# Patient Record
Sex: FEMALE | Race: OTHER | Employment: FULL TIME | URBAN - METROPOLITAN AREA
[De-identification: names, ages, dates, MRNs, and addresses within clinical notes are randomized per-mention and may not be internally consistent; named-entity substitution may affect disease eponyms.]

---

## 2019-08-07 ENCOUNTER — APPOINTMENT (OUTPATIENT)
Dept: CT IMAGING | Age: 34
DRG: 194 | End: 2019-08-07
Attending: PHYSICIAN ASSISTANT
Payer: COMMERCIAL

## 2019-08-07 ENCOUNTER — HOSPITAL ENCOUNTER (INPATIENT)
Age: 34
LOS: 4 days | Discharge: HOME | DRG: 194 | End: 2019-08-11
Attending: EMERGENCY MEDICINE | Admitting: FAMILY MEDICINE
Payer: COMMERCIAL

## 2019-08-07 ENCOUNTER — APPOINTMENT (OUTPATIENT)
Dept: GENERAL RADIOLOGY | Age: 34
DRG: 194 | End: 2019-08-07
Attending: EMERGENCY MEDICINE
Payer: COMMERCIAL

## 2019-08-07 DIAGNOSIS — R06.00 DYSPNEA, UNSPECIFIED TYPE: Primary | ICD-10-CM

## 2019-08-07 DIAGNOSIS — R91.8 ABNORMAL CT SCAN, LUNG: ICD-10-CM

## 2019-08-07 LAB
ALBUMIN SERPL-MCNC: 3 G/DL (ref 3.4–5)
ALBUMIN/GLOB SERPL: 0.7 {RATIO} (ref 0.8–1.7)
ALP SERPL-CCNC: 85 U/L (ref 45–117)
ALT SERPL-CCNC: 21 U/L (ref 13–56)
ANION GAP SERPL CALC-SCNC: 7 MMOL/L (ref 3–18)
APPEARANCE UR: CLEAR
AST SERPL-CCNC: 16 U/L (ref 10–38)
ATRIAL RATE: 93 BPM
BASOPHILS # BLD: 0 K/UL (ref 0–0.1)
BASOPHILS NFR BLD: 0 % (ref 0–2)
BILIRUB SERPL-MCNC: 0.2 MG/DL (ref 0.2–1)
BILIRUB UR QL: NEGATIVE
BUN SERPL-MCNC: 11 MG/DL (ref 7–18)
BUN/CREAT SERPL: 18 (ref 12–20)
CALCIUM SERPL-MCNC: 8.5 MG/DL (ref 8.5–10.1)
CALCULATED P AXIS, ECG09: 36 DEGREES
CALCULATED R AXIS, ECG10: 5 DEGREES
CALCULATED T AXIS, ECG11: 57 DEGREES
CHLORIDE SERPL-SCNC: 107 MMOL/L (ref 100–111)
CK MB CFR SERPL CALC: NORMAL % (ref 0–4)
CK MB SERPL-MCNC: <1 NG/ML (ref 5–25)
CK SERPL-CCNC: 152 U/L (ref 26–192)
CO2 SERPL-SCNC: 27 MMOL/L (ref 21–32)
COLOR UR: YELLOW
CREAT SERPL-MCNC: 0.61 MG/DL (ref 0.6–1.3)
D DIMER PPP FEU-MCNC: 0.56 UG/ML(FEU)
DIAGNOSIS, 93000: NORMAL
DIFFERENTIAL METHOD BLD: ABNORMAL
EOSINOPHIL # BLD: 0.6 K/UL (ref 0–0.4)
EOSINOPHIL NFR BLD: 6 % (ref 0–5)
ERYTHROCYTE [DISTWIDTH] IN BLOOD BY AUTOMATED COUNT: 15.2 % (ref 11.6–14.5)
GLOBULIN SER CALC-MCNC: 4.1 G/DL (ref 2–4)
GLUCOSE SERPL-MCNC: 121 MG/DL (ref 74–99)
GLUCOSE UR STRIP.AUTO-MCNC: NEGATIVE MG/DL
HCG UR QL: NEGATIVE
HCT VFR BLD AUTO: 35.7 % (ref 35–45)
HGB BLD-MCNC: 11.4 G/DL (ref 12–16)
HGB UR QL STRIP: NEGATIVE
KETONES UR QL STRIP.AUTO: NEGATIVE MG/DL
LEUKOCYTE ESTERASE UR QL STRIP.AUTO: NEGATIVE
LYMPHOCYTES # BLD: 2.6 K/UL (ref 0.9–3.6)
LYMPHOCYTES NFR BLD: 28 % (ref 21–52)
MCH RBC QN AUTO: 25.6 PG (ref 24–34)
MCHC RBC AUTO-ENTMCNC: 31.9 G/DL (ref 31–37)
MCV RBC AUTO: 80.2 FL (ref 74–97)
MONOCYTES # BLD: 0.6 K/UL (ref 0.05–1.2)
MONOCYTES NFR BLD: 7 % (ref 3–10)
NEUTS SEG # BLD: 5.4 K/UL (ref 1.8–8)
NEUTS SEG NFR BLD: 59 % (ref 40–73)
NITRITE UR QL STRIP.AUTO: NEGATIVE
P-R INTERVAL, ECG05: 118 MS
PH UR STRIP: 7 [PH] (ref 5–8)
PLATELET # BLD AUTO: 329 K/UL (ref 135–420)
PMV BLD AUTO: 9.5 FL (ref 9.2–11.8)
POTASSIUM SERPL-SCNC: 3.9 MMOL/L (ref 3.5–5.5)
PROT SERPL-MCNC: 7.1 G/DL (ref 6.4–8.2)
PROT UR STRIP-MCNC: NEGATIVE MG/DL
Q-T INTERVAL, ECG07: 372 MS
QRS DURATION, ECG06: 84 MS
QTC CALCULATION (BEZET), ECG08: 462 MS
RBC # BLD AUTO: 4.45 M/UL (ref 4.2–5.3)
SODIUM SERPL-SCNC: 141 MMOL/L (ref 136–145)
SP GR UR REFRACTOMETRY: 1.03 (ref 1–1.03)
TROPONIN I SERPL-MCNC: <0.02 NG/ML (ref 0–0.04)
UROBILINOGEN UR QL STRIP.AUTO: 1 EU/DL (ref 0.2–1)
VENTRICULAR RATE, ECG03: 93 BPM
WBC # BLD AUTO: 9.1 K/UL (ref 4.6–13.2)

## 2019-08-07 PROCEDURE — 74011636320 HC RX REV CODE- 636/320: Performed by: EMERGENCY MEDICINE

## 2019-08-07 PROCEDURE — 71275 CT ANGIOGRAPHY CHEST: CPT

## 2019-08-07 PROCEDURE — 65270000029 HC RM PRIVATE

## 2019-08-07 PROCEDURE — 80053 COMPREHEN METABOLIC PANEL: CPT

## 2019-08-07 PROCEDURE — 82550 ASSAY OF CK (CPK): CPT

## 2019-08-07 PROCEDURE — 94762 N-INVAS EAR/PLS OXIMTRY CONT: CPT

## 2019-08-07 PROCEDURE — 81003 URINALYSIS AUTO W/O SCOPE: CPT

## 2019-08-07 PROCEDURE — 77030027138 HC INCENT SPIROMETER -A

## 2019-08-07 PROCEDURE — 71046 X-RAY EXAM CHEST 2 VIEWS: CPT

## 2019-08-07 PROCEDURE — 99285 EMERGENCY DEPT VISIT HI MDM: CPT

## 2019-08-07 PROCEDURE — 81025 URINE PREGNANCY TEST: CPT

## 2019-08-07 PROCEDURE — 77030040361 HC SLV COMPR DVT MDII -B

## 2019-08-07 PROCEDURE — 85025 COMPLETE CBC W/AUTO DIFF WBC: CPT

## 2019-08-07 PROCEDURE — 93005 ELECTROCARDIOGRAM TRACING: CPT

## 2019-08-07 PROCEDURE — 85379 FIBRIN DEGRADATION QUANT: CPT

## 2019-08-07 RX ORDER — SODIUM CHLORIDE 9 MG/ML
100 INJECTION, SOLUTION INTRAVENOUS CONTINUOUS
Status: DISCONTINUED | OUTPATIENT
Start: 2019-08-07 | End: 2019-08-08

## 2019-08-07 RX ORDER — NORGESTIMATE AND ETHINYL ESTRADIOL 7DAYSX3 28
1 KIT ORAL DAILY
Status: DISCONTINUED | OUTPATIENT
Start: 2019-08-08 | End: 2019-08-08 | Stop reason: SDUPTHER

## 2019-08-07 RX ORDER — LEVOTHYROXINE SODIUM 75 UG/1
75 TABLET ORAL
Status: DISCONTINUED | OUTPATIENT
Start: 2019-08-08 | End: 2019-08-11 | Stop reason: HOSPADM

## 2019-08-07 RX ORDER — LEVOTHYROXINE SODIUM 75 UG/1
TABLET ORAL
COMMUNITY

## 2019-08-07 RX ORDER — NORGESTIMATE AND ETHINYL ESTRADIOL 7DAYSX3 28
KIT ORAL
COMMUNITY

## 2019-08-07 RX ORDER — VENLAFAXINE 37.5 MG/1
37.5 TABLET ORAL 3 TIMES DAILY
COMMUNITY

## 2019-08-07 RX ORDER — HEPARIN SODIUM 5000 [USP'U]/ML
5000 INJECTION, SOLUTION INTRAVENOUS; SUBCUTANEOUS EVERY 8 HOURS
Status: DISCONTINUED | OUTPATIENT
Start: 2019-08-07 | End: 2019-08-11 | Stop reason: HOSPADM

## 2019-08-07 RX ORDER — IPRATROPIUM BROMIDE AND ALBUTEROL SULFATE 2.5; .5 MG/3ML; MG/3ML
3 SOLUTION RESPIRATORY (INHALATION)
Status: DISCONTINUED | OUTPATIENT
Start: 2019-08-07 | End: 2019-08-11 | Stop reason: HOSPADM

## 2019-08-07 RX ORDER — VENLAFAXINE 37.5 MG/1
37.5 TABLET ORAL 3 TIMES DAILY
Status: DISCONTINUED | OUTPATIENT
Start: 2019-08-08 | End: 2019-08-11 | Stop reason: HOSPADM

## 2019-08-07 RX ADMIN — IOPAMIDOL 100 ML: 755 INJECTION, SOLUTION INTRAVENOUS at 20:06

## 2019-08-07 NOTE — ED PROVIDER NOTES
EMERGENCY DEPARTMENT HISTORY AND PHYSICAL EXAM    Date: 8/7/2019  Patient Name: Manuel Chamberlain    History of Presenting Illness     Chief Complaint   Patient presents with    Shortness of Breath    Palpitations         History Provided By: Patient    Chief Complaint: sob       Additional History (Context):   6:38 PM  Manuel Chamberlain is a 27 y.o. female  presents to the emergency department C/O shortness of breath for the past 2 days. Also complaining that her heart feels like it is racing when she gets up and moves around and had some dizziness. She is had some chest pain that felt sharp and lasted a few minutes around 11 AM.  None since. She has had a dry cough for the past week and a half, was seen by her PCP when it started and was diagnosed with a URI and given cough medication. No leg swelling, no fevers. She denies any long trips recently recent surgeries recent immobilizations history of blood clots or cancer but she does take birth control pills. Patient denies recent travel, incarceration, recent hospitalization, exotic pets. Patient states she works for [employer] and gets a TB test yearly last was in October and was negative    PCP: Jovana aVrgas MD    Current Facility-Administered Medications   Medication Dose Route Frequency Provider Last Rate Last Dose    0.9% sodium chloride infusion  100 mL/hr IntraVENous CONTINUOUS Jesus Rea MD        heparin (porcine) injection 5,000 Units  5,000 Units SubCUTAneous Gabe Ross MD           Past History     Past Medical History:  Past Medical History:   Diagnosis Date    Hashimoto's disease        Past Surgical History:  Past Surgical History:   Procedure Laterality Date    HX KNEE REPLACEMENT         Family History:  History reviewed. No pertinent family history.     Social History:  Social History     Tobacco Use    Smoking status: Former Smoker    Smokeless tobacco: Never Used   Substance Use Topics    Alcohol use: Not Currently    Drug use: Not on file       Allergies:  No Known Allergies    Review of Systems   Review of Systems   Constitutional: Negative for chills and fever. Respiratory: Positive for cough and shortness of breath. Cardiovascular: Positive for chest pain. Negative for leg swelling. Gastrointestinal: Negative for abdominal pain, diarrhea, nausea and vomiting. Musculoskeletal: Negative for back pain. Neurological: Negative for weakness and numbness. All other systems reviewed and are negative. Physical Exam     Vitals:    08/07/19 2030 08/07/19 2100 08/07/19 2130 08/07/19 2304   BP: (!) 141/95 (!) 147/97 149/73 126/72   Pulse: 95 87 84 81   Resp: 24 22 17 15   Temp:   98.9 °F (37.2 °C) 98.9 °F (37.2 °C)   SpO2: 91% 92% 94% 92%   Weight:       Height:         Physical Exam   Constitutional: She is oriented to person, place, and time. She appears well-developed and well-nourished. Alert well-appearing nontoxic     HENT:   Head: Normocephalic and atraumatic. Neck: Normal range of motion. Neck supple. Cardiovascular: Normal rate, regular rhythm, normal heart sounds and intact distal pulses. No murmur heard. Pulmonary/Chest: Effort normal and breath sounds normal. No respiratory distress. She has no wheezes. She has no rales. Abdominal: Soft. Bowel sounds are normal. There is no tenderness. Musculoskeletal:   No leg swelling or calf tenderness bilaterally   Neurological: She is alert and oriented to person, place, and time. Psychiatric: She has a normal mood and affect. Judgment normal.   Nursing note and vitals reviewed.           Diagnostic Study Results     Labs:     Recent Results (from the past 12 hour(s))   EKG, 12 LEAD, INITIAL    Collection Time: 08/07/19  5:33 PM   Result Value Ref Range    Ventricular Rate 93 BPM    Atrial Rate 93 BPM    P-R Interval 118 ms    QRS Duration 84 ms    Q-T Interval 372 ms    QTC Calculation (Bezet) 462 ms    Calculated P Axis 36 degrees Calculated R Axis 5 degrees    Calculated T Axis 57 degrees    Diagnosis       Normal sinus rhythm  Normal ECG  Confirmed by Gideon Castano MD, [facility] ([id number]) on 8/7/2019 7:22:39 PM     CBC WITH AUTOMATED DIFF    Collection Time: 08/07/19  5:41 PM   Result Value Ref Range    WBC 9.1 4.6 - 13.2 K/uL    RBC 4.45 4.20 - 5.30 M/uL    HGB 11.4 (L) 12.0 - 16.0 g/dL    HCT 35.7 35.0 - 45.0 %    MCV 80.2 74.0 - 97.0 FL    MCH 25.6 24.0 - 34.0 PG    MCHC 31.9 31.0 - 37.0 g/dL    RDW 15.2 (H) 11.6 - 14.5 %    PLATELET 152 857 - 768 K/uL    MPV 9.5 9.2 - 11.8 FL    NEUTROPHILS 59 40 - 73 %    LYMPHOCYTES 28 21 - 52 %    MONOCYTES 7 3 - 10 %    EOSINOPHILS 6 (H) 0 - 5 %    BASOPHILS 0 0 - 2 %    ABS. NEUTROPHILS 5.4 1.8 - 8.0 K/UL    ABS. LYMPHOCYTES 2.6 0.9 - 3.6 K/UL    ABS. MONOCYTES 0.6 0.05 - 1.2 K/UL    ABS. EOSINOPHILS 0.6 (H) 0.0 - 0.4 K/UL    ABS. BASOPHILS 0.0 0.0 - 0.1 K/UL    DF AUTOMATED     CARDIAC PANEL,(CK, CKMB & TROPONIN)    Collection Time: 08/07/19  5:41 PM   Result Value Ref Range     26 - 192 U/L    CK - MB <1.0 <3.6 ng/ml    CK-MB Index  0.0 - 4.0 %     CALCULATION NOT PERFORMED WHEN RESULT IS BELOW LINEAR LIMIT    Troponin-I, QT <0.02 0.0 - 3.774 NG/ML   METABOLIC PANEL, COMPREHENSIVE    Collection Time: 08/07/19  5:41 PM   Result Value Ref Range    Sodium 141 136 - 145 mmol/L    Potassium 3.9 3.5 - 5.5 mmol/L    Chloride 107 100 - 111 mmol/L    CO2 27 21 - 32 mmol/L    Anion gap 7 3.0 - 18 mmol/L    Glucose 121 (H) 74 - 99 mg/dL    BUN 11 7.0 - 18 MG/DL    Creatinine 0.61 0.6 - 1.3 MG/DL    BUN/Creatinine ratio 18 12 - 20      GFR est AA >60 >60 ml/min/1.73m2    GFR est non-AA >60 >60 ml/min/1.73m2    Calcium 8.5 8.5 - 10.1 MG/DL    Bilirubin, total 0.2 0.2 - 1.0 MG/DL    ALT (SGPT) 21 13 - 56 U/L    AST (SGOT) 16 10 - 38 U/L    Alk.  phosphatase 85 45 - 117 U/L    Protein, total 7.1 6.4 - 8.2 g/dL    Albumin 3.0 (L) 3.4 - 5.0 g/dL    Globulin 4.1 (H) 2.0 - 4.0 g/dL    A-G Ratio 0.7 (L) 0.8 - 1.7     D DIMER Collection Time: 08/07/19  5:41 PM   Result Value Ref Range    D DIMER 0.56 (H) <0.46 ug/ml(FEU)   URINALYSIS W/ RFLX MICROSCOPIC    Collection Time: 08/07/19  6:56 PM   Result Value Ref Range    Color YELLOW      Appearance CLEAR      Specific gravity 1.026 1.005 - 1.030      pH (UA) 7.0 5.0 - 8.0      Protein NEGATIVE  NEG mg/dL    Glucose NEGATIVE  NEG mg/dL    Ketone NEGATIVE  NEG mg/dL    Bilirubin NEGATIVE  NEG      Blood NEGATIVE  NEG      Urobilinogen 1.0 0.2 - 1.0 EU/dL    Nitrites NEGATIVE  NEG      Leukocyte Esterase NEGATIVE  NEG     HCG URINE, QL    Collection Time: 08/07/19  6:56 PM   Result Value Ref Range    HCG urine, QL NEGATIVE  NEG         Radiologic Studies:   CTA CHEST W OR W WO CONT   Final Result   IMPRESSION:      No evidence of a pulmonary embolism or aortic dissection. Mediastinal and hilar adenopathy. Diffuse reticular nodular interstitial infiltrates throughout both lungs with a   miliary type pattern with differential diagnoses including bacterial or viral   pneumonia versus miliary tuberculosis versus metastatic renal or thyroid   carcinoma. Correlate with clinical picture. Amirah Astudillo PA informed 9:25 PM August 7, 2019. XR CHEST PA LAT   Final Result   IMPRESSION:  No acute process        CT Results  (Last 48 hours)               08/07/19 2030  CTA CHEST W OR W WO CONT Final result    Impression:  IMPRESSION:       No evidence of a pulmonary embolism or aortic dissection. Mediastinal and hilar adenopathy. Diffuse reticular nodular interstitial infiltrates throughout both lungs with a   miliary type pattern with differential diagnoses including bacterial or viral   pneumonia versus miliary tuberculosis versus metastatic renal or thyroid   carcinoma. Correlate with clinical picture. Amirah Astudillo PA informed 9:25 PM August 7, 2019.            Narrative:  EXAM: CTA chest       INDICATION: Chest pain       COMPARISON: None       TECHNIQUE: Axial CT imaging from the thoracic inlet through the diaphragm with   intravenous contrast. Coronal and sagittal MIP reformats were generated. One or   more dose reduction techniques were used on this CT: automated exposure control,   adjustment of the mAs and/or kVp according to patient size, and iterative   reconstruction techniques. The specific techniques used on this CT exam have   been documented in the patient's electronic medical record. Digital Imaging and   Communications in Medicine (DICOM) format image data are available to   nonaffiliated external healthcare facilities or entities on a secure, media   free, reciprocally searchable basis with patient authorization for at least a   12-month period after this study. _______________       FINDINGS: This study was repeated due to poor contrast opacification on first   attempt. EXAM QUALITY: Overall exam quality is satisfactory. Pulmonary arterial   enhancement is adequate with adequate breath hold and no significant artifact. PULMONARY ARTERIES: No evidence of pulmonary embolism. LYMPH Nodes: Multiple enlarged right hilar lymph nodes are seen measuring up to   1.2 cm. There are enlarged AP window lymph nodes measuring 13 mm. Subcentimeter   left hilar lymph nodes are seen. PLEURA: There are no pleural effusions       HEART: Normal in size without pericardial effusion. VASCULATURE/MEDIASTINUM: There is no aortic dissection. LUNGS: There are diffuse reticular nodular interstitial infiltrates with a   miliary-type appearance throughout the right lung and to a lesser degree in the   left lung. Differential diagnoses includes bacterial or viral pneumonia versus   miliary tuberculosis versus thyroid or renal metastasis. Correlate with clinical   picture. AIRWAY: Normal.       UPPER ABDOMEN: There is 11 mm calculus in the upper pole the left kidney. Otherwise visualized solid organs are unremarkable.        OTHER: No acute or aggressive osseous abnormalities identified. _______________               CXR Results  (Last 48 hours)               08/07/19 1834  XR CHEST PA LAT Final result    Impression:  IMPRESSION:  No acute process       Narrative:  EXAM:  PA and Lateral Chest X-ray        INDICATION: Upper respiratory tract infection about one half weeks ago       COMPARISON: None       ___________       FINDINGS: Heart and mediastinal contours are within normal limits. Lungs are   clear of active disease. There are no pleural effusions. No acute osseous   findings. _____________                       Medical Decision Making   I am the first provider for this patient. I reviewed the vital signs, available nursing notes, past medical history, past surgical history, family history and social history. Vital Signs: Reviewed the patient's vital signs. Pulse Oximetry Analysis: 100% on RA       EKG interpretation: (Preliminary)  6:38 PM   Normal sinus rhythm, rate 93 bpm, no STEMI  EKG read by Vasquez Dale PA-C at 5:33 PM     Records Reviewed: Nursing Notes and Old Medical Records    Procedures:  Procedures    ED Course:   6:38 PM Initial assessment performed. The patients presenting problems have been discussed, and they are in agreement with the care plan formulated and outlined with them. I have encouraged them to ask questions as they arise throughout their visit. CONSULT NOTE:   10:00 PM  Vasquez Dale PA-C spoke with Adolfo Tejeda MD   Specialty: ED attending   Discussed pt's hx, disposition, and available diagnostic and imaging results. Reviewed care plans. Consulting physician agrees with plans as outlined. .   Written by Umu Salmeron PA-C    CONSULT NOTE:   9:50 PM  Vasquez Dale PA-C spoke with Yair Martinez MD    Specialty: Pulmonologist   Discussed pt's hx, disposition, and available diagnostic and imaging results. Reviewed care plans.  Consulting physician recommends admission with serial sputum cultures and r/o TB. Written by Chyna Winters PA-C      Core Measures:  For Hospitalized Patients:    1. Hospitalization Decision Time:  The decision to hospitalize the patient was made by Breanne Merritt PA-C at 10:01 PM on 8/7/2019    2. Aspirin: Aspirin was not given because the patient did not present with a stroke at the time of their Emergency Department evaluation    10:00 PM  Patient is being admitted to the hospital by Venice Gutierrez. The results of their tests and reasons for their admission have been discussed with them and/or available family. They convey agreement and understanding for the need to be admitted and for their admission diagnosis. CONDITIONS ON ADMISSION:  Sepsis is not present at the time of admission. Deep Vein Thrombosis is not present at the time of admission. Thrombosis is not present at the time of admission. Urinary Tract Infection is not present at the time of admission. MRSA is not present at the time of admission. Wound infection is not present at the time of admission. Pressure Ulcer is not present at the time of admission. CLINICAL IMPRESSION:    1. Dyspnea, unspecified type    2. Abnormal CT scan, lung          Discussion:  Pt presents with shortness of breath for the past week and a half with a dry cough. D-dimer slightly elevated. CTA shows no clot but suspicious findings, differential includes TB infection or mets. No risk factors for TB. Will admit. Diagnosis and Disposition       PLAN:  1.admit        Please note that this dictation was completed with [employer], the computer voice recognition software. Quite often unanticipated grammatical, syntax, homophones, and other interpretive errors are inadvertently transcribed by the computer software. Please disregard these errors. Please excuse any errors that have escaped final proofreading.

## 2019-08-07 NOTE — ED TRIAGE NOTES
Patient states that she was seen for an upper respiratory infection about 1.5 weeks ago.  Patient states that she started with shortness of breath and heart palpitations yesterday

## 2019-08-08 ENCOUNTER — HOSPITAL ENCOUNTER (INPATIENT)
Dept: ULTRASOUND IMAGING | Age: 34
Discharge: HOME | DRG: 194 | End: 2019-08-08
Attending: HOSPITALIST
Payer: COMMERCIAL

## 2019-08-08 ENCOUNTER — APPOINTMENT (OUTPATIENT)
Dept: NON INVASIVE DIAGNOSTICS | Age: 34
DRG: 194 | End: 2019-08-08
Attending: HOSPITALIST
Payer: COMMERCIAL

## 2019-08-08 ENCOUNTER — APPOINTMENT (OUTPATIENT)
Dept: CT IMAGING | Age: 34
DRG: 194 | End: 2019-08-08
Attending: HOSPITALIST
Payer: COMMERCIAL

## 2019-08-08 PROBLEM — Z87.891 EX-SMOKER: Status: ACTIVE | Noted: 2019-08-08

## 2019-08-08 PROBLEM — E06.3 HASHIMOTO'S DISEASE: Status: ACTIVE | Noted: 2019-08-08

## 2019-08-08 PROBLEM — E66.01 MORBID OBESITY WITH BMI OF 45.0-49.9, ADULT (HCC): Status: ACTIVE | Noted: 2019-08-08

## 2019-08-08 PROBLEM — R91.8 LUNG INFILTRATE ON CT: Status: ACTIVE | Noted: 2019-08-08

## 2019-08-08 LAB
ALBUMIN SERPL-MCNC: 2.9 G/DL (ref 3.4–5)
ALBUMIN/GLOB SERPL: 0.7 {RATIO} (ref 0.8–1.7)
ALP SERPL-CCNC: 83 U/L (ref 45–117)
ALT SERPL-CCNC: 23 U/L (ref 13–56)
ANION GAP SERPL CALC-SCNC: 11 MMOL/L (ref 3–18)
AST SERPL-CCNC: 16 U/L (ref 10–38)
BILIRUB SERPL-MCNC: 0.1 MG/DL (ref 0.2–1)
BUN SERPL-MCNC: 10 MG/DL (ref 7–18)
BUN/CREAT SERPL: 14 (ref 12–20)
CALCIUM SERPL-MCNC: 8.9 MG/DL (ref 8.5–10.1)
CHLORIDE SERPL-SCNC: 109 MMOL/L (ref 100–111)
CO2 SERPL-SCNC: 23 MMOL/L (ref 21–32)
CREAT SERPL-MCNC: 0.72 MG/DL (ref 0.6–1.3)
ECHO AO ASC DIAM: 3.35 CM
ECHO AO ROOT DIAM: 3.06 CM
ECHO AV AREA PEAK VELOCITY: 2.3 CM2
ECHO AV AREA VTI: 2.4 CM2
ECHO AV MEAN GRADIENT: 7.2 MMHG
ECHO AV PEAK GRADIENT: 12.6 MMHG
ECHO AV PEAK VELOCITY: 177.57 CM/S
ECHO AV VTI: 32.96 CM
ECHO IVC PROX: 2 CM
ECHO LA MAJOR AXIS: 4.43 CM
ECHO LA VOL 2C: 71.36 ML (ref 22–52)
ECHO LA VOL 4C: 36.96 ML (ref 22–52)
ECHO LA VOL BP: 59.02 ML (ref 22–52)
ECHO LA VOL/BSA BIPLANE: 25.98 ML/M2 (ref 16–28)
ECHO LA VOLUME INDEX A2C: 31.41 ML/M2 (ref 16–28)
ECHO LA VOLUME INDEX A4C: 16.27 ML/M2 (ref 16–28)
ECHO LV E' LATERAL VELOCITY: 16 CM/S
ECHO LV E' SEPTAL VELOCITY: 14 CM/S
ECHO LV EDV A2C: 26.2 ML
ECHO LV EDV A4C: 62.5 ML
ECHO LV EDV BP: 44.6 ML (ref 56–104)
ECHO LV EDV INDEX A4C: 27.5 ML/M2
ECHO LV EDV INDEX BP: 19.6 ML/M2
ECHO LV EDV NDEX A2C: 11.5 ML/M2
ECHO LV EJECTION FRACTION A2C: 41 %
ECHO LV EJECTION FRACTION A4C: 65 %
ECHO LV EJECTION FRACTION BIPLANE: 58.4 % (ref 55–100)
ECHO LV ESV A2C: 15.5 ML
ECHO LV ESV A4C: 21.8 ML
ECHO LV ESV BP: 18.6 ML (ref 19–49)
ECHO LV ESV INDEX A2C: 6.8 ML/M2
ECHO LV ESV INDEX A4C: 9.6 ML/M2
ECHO LV ESV INDEX BP: 8.2 ML/M2
ECHO LV INTERNAL DIMENSION DIASTOLIC: 4.44 CM (ref 3.9–5.3)
ECHO LV INTERNAL DIMENSION SYSTOLIC: 3.03 CM
ECHO LV IVSD: 1.38 CM (ref 0.6–0.9)
ECHO LV MASS 2D: 288.3 G (ref 67–162)
ECHO LV MASS INDEX 2D: 126.9 G/M2 (ref 43–95)
ECHO LV POSTERIOR WALL DIASTOLIC: 1.41 CM (ref 0.6–0.9)
ECHO LVOT DIAM: 1.97 CM
ECHO LVOT PEAK GRADIENT: 6.9 MMHG
ECHO LVOT PEAK VELOCITY: 131.58 CM/S
ECHO LVOT VTI: 25.53 CM
ECHO MV A VELOCITY: 66.82 CM/S
ECHO MV AREA PHT: 2.9 CM2
ECHO MV E DECELERATION TIME (DT): 265.3 MS
ECHO MV E VELOCITY: 95.57 CM/S
ECHO MV E/A RATIO: 1.43
ECHO MV E/E' LATERAL: 5.97
ECHO MV E/E' RATIO (AVERAGED): 6.4
ECHO MV E/E' SEPTAL: 6.83
ECHO MV PRESSURE HALF TIME (PHT): 76.9 MS
ECHO RA AREA 4C: 10.09 CM2
ECHO RV INTERNAL DIMENSION: 3.98 CM
ERYTHROCYTE [DISTWIDTH] IN BLOOD BY AUTOMATED COUNT: 16 % (ref 11.6–14.5)
ERYTHROCYTE [SEDIMENTATION RATE] IN BLOOD: 67 MM/HR (ref 0–20)
FLUAV AG NPH QL IA: POSITIVE
FLUBV AG NOSE QL IA: NEGATIVE
GLOBULIN SER CALC-MCNC: 4.4 G/DL (ref 2–4)
GLUCOSE BLD STRIP.AUTO-MCNC: 138 MG/DL (ref 70–110)
GLUCOSE BLD STRIP.AUTO-MCNC: 152 MG/DL (ref 70–110)
GLUCOSE SERPL-MCNC: 184 MG/DL (ref 74–99)
HBA1C MFR BLD: 5.8 % (ref 4.2–5.6)
HCT VFR BLD AUTO: 34 % (ref 35–45)
HGB BLD-MCNC: 11.2 G/DL (ref 12–16)
L PNEUMO AG UR QL IA: NEGATIVE
MCH RBC QN AUTO: 27.9 PG (ref 24–34)
MCHC RBC AUTO-ENTMCNC: 32.9 G/DL (ref 31–37)
MCV RBC AUTO: 84.6 FL (ref 74–97)
PLATELET # BLD AUTO: 319 K/UL (ref 135–420)
PMV BLD AUTO: 9.5 FL (ref 9.2–11.8)
POTASSIUM SERPL-SCNC: 4.3 MMOL/L (ref 3.5–5.5)
PROT SERPL-MCNC: 7.3 G/DL (ref 6.4–8.2)
RBC # BLD AUTO: 4.02 M/UL (ref 4.2–5.3)
S PNEUM AG UR QL: NEGATIVE
SODIUM SERPL-SCNC: 143 MMOL/L (ref 136–145)
TSH SERPL DL<=0.05 MIU/L-ACNC: 3.64 UIU/ML (ref 0.36–3.74)
WBC # BLD AUTO: 7.3 K/UL (ref 4.6–13.2)

## 2019-08-08 PROCEDURE — 84443 ASSAY THYROID STIM HORMONE: CPT

## 2019-08-08 PROCEDURE — 82164 ANGIOTENSIN I ENZYME TEST: CPT

## 2019-08-08 PROCEDURE — 87450 LEGIONELLA PNEUMOPHILA AG, URINE: CPT

## 2019-08-08 PROCEDURE — 87389 HIV-1 AG W/HIV-1&-2 AB AG IA: CPT

## 2019-08-08 PROCEDURE — 74011636637 HC RX REV CODE- 636/637: Performed by: FAMILY MEDICINE

## 2019-08-08 PROCEDURE — 74176 CT ABD & PELVIS W/O CONTRAST: CPT

## 2019-08-08 PROCEDURE — 85027 COMPLETE CBC AUTOMATED: CPT

## 2019-08-08 PROCEDURE — 74011250636 HC RX REV CODE- 250/636: Performed by: FAMILY MEDICINE

## 2019-08-08 PROCEDURE — 65270000029 HC RM PRIVATE

## 2019-08-08 PROCEDURE — 74011250637 HC RX REV CODE- 250/637: Performed by: HOSPITALIST

## 2019-08-08 PROCEDURE — 76536 US EXAM OF HEAD AND NECK: CPT

## 2019-08-08 PROCEDURE — 87077 CULTURE AEROBIC IDENTIFY: CPT

## 2019-08-08 PROCEDURE — 74011250637 HC RX REV CODE- 250/637: Performed by: FAMILY MEDICINE

## 2019-08-08 PROCEDURE — 87070 CULTURE OTHR SPECIMN AEROBIC: CPT

## 2019-08-08 PROCEDURE — 86606 ASPERGILLUS ANTIBODY: CPT

## 2019-08-08 PROCEDURE — 93306 TTE W/DOPPLER COMPLETE: CPT

## 2019-08-08 PROCEDURE — 85652 RBC SED RATE AUTOMATED: CPT

## 2019-08-08 PROCEDURE — 82962 GLUCOSE BLOOD TEST: CPT

## 2019-08-08 PROCEDURE — 94640 AIRWAY INHALATION TREATMENT: CPT

## 2019-08-08 PROCEDURE — 74011000250 HC RX REV CODE- 250: Performed by: FAMILY MEDICINE

## 2019-08-08 PROCEDURE — 86141 C-REACTIVE PROTEIN HS: CPT

## 2019-08-08 PROCEDURE — 87804 INFLUENZA ASSAY W/OPTIC: CPT

## 2019-08-08 PROCEDURE — 77010033678 HC OXYGEN DAILY

## 2019-08-08 PROCEDURE — C9113 INJ PANTOPRAZOLE SODIUM, VIA: HCPCS | Performed by: FAMILY MEDICINE

## 2019-08-08 PROCEDURE — 87186 SC STD MICRODIL/AGAR DIL: CPT

## 2019-08-08 PROCEDURE — 36415 COLL VENOUS BLD VENIPUNCTURE: CPT

## 2019-08-08 PROCEDURE — 83036 HEMOGLOBIN GLYCOSYLATED A1C: CPT

## 2019-08-08 PROCEDURE — 87116 MYCOBACTERIA CULTURE: CPT

## 2019-08-08 PROCEDURE — 87449 NOS EACH ORGANISM AG IA: CPT

## 2019-08-08 PROCEDURE — 77030013140 HC MSK NEB VYRM -A

## 2019-08-08 PROCEDURE — 80053 COMPREHEN METABOLIC PANEL: CPT

## 2019-08-08 RX ORDER — OSELTAMIVIR PHOSPHATE 75 MG/1
75 CAPSULE ORAL 2 TIMES DAILY
Status: DISCONTINUED | OUTPATIENT
Start: 2019-08-08 | End: 2019-08-11 | Stop reason: HOSPADM

## 2019-08-08 RX ORDER — BUDESONIDE 0.5 MG/2ML
500 INHALANT ORAL
Status: DISCONTINUED | OUTPATIENT
Start: 2019-08-08 | End: 2019-08-08

## 2019-08-08 RX ORDER — INSULIN LISPRO 100 [IU]/ML
INJECTION, SOLUTION INTRAVENOUS; SUBCUTANEOUS
Status: DISCONTINUED | OUTPATIENT
Start: 2019-08-08 | End: 2019-08-08

## 2019-08-08 RX ORDER — NORGESTIMATE AND ETHINYL ESTRADIOL 0.25-0.035
1 KIT ORAL DAILY
Status: DISCONTINUED | OUTPATIENT
Start: 2019-08-08 | End: 2019-08-11 | Stop reason: HOSPADM

## 2019-08-08 RX ORDER — ARFORMOTEROL TARTRATE 15 UG/2ML
15 SOLUTION RESPIRATORY (INHALATION)
Status: DISCONTINUED | OUTPATIENT
Start: 2019-08-08 | End: 2019-08-08

## 2019-08-08 RX ADMIN — METHYLPREDNISOLONE SODIUM SUCCINATE 125 MG: 125 INJECTION, POWDER, FOR SOLUTION INTRAMUSCULAR; INTRAVENOUS at 00:15

## 2019-08-08 RX ADMIN — METHYLPREDNISOLONE SODIUM SUCCINATE 125 MG: 125 INJECTION, POWDER, FOR SOLUTION INTRAMUSCULAR; INTRAVENOUS at 08:22

## 2019-08-08 RX ADMIN — VENLAFAXINE 37.5 MG: 37.5 TABLET ORAL at 21:28

## 2019-08-08 RX ADMIN — SODIUM CHLORIDE 100 ML/HR: 900 INJECTION, SOLUTION INTRAVENOUS at 00:16

## 2019-08-08 RX ADMIN — CEFTRIAXONE 1 G: 1 INJECTION, POWDER, FOR SOLUTION INTRAMUSCULAR; INTRAVENOUS at 04:37

## 2019-08-08 RX ADMIN — HEPARIN SODIUM 5000 UNITS: 5000 INJECTION INTRAVENOUS; SUBCUTANEOUS at 07:52

## 2019-08-08 RX ADMIN — OSELTAMIVIR PHOSPHATE 75 MG: 75 CAPSULE ORAL at 21:28

## 2019-08-08 RX ADMIN — IPRATROPIUM BROMIDE AND ALBUTEROL SULFATE 3 ML: .5; 3 SOLUTION RESPIRATORY (INHALATION) at 12:05

## 2019-08-08 RX ADMIN — HEPARIN SODIUM 5000 UNITS: 5000 INJECTION INTRAVENOUS; SUBCUTANEOUS at 21:29

## 2019-08-08 RX ADMIN — HEPARIN SODIUM 5000 UNITS: 5000 INJECTION INTRAVENOUS; SUBCUTANEOUS at 14:46

## 2019-08-08 RX ADMIN — LEVOTHYROXINE SODIUM 75 MCG: 75 TABLET ORAL at 07:30

## 2019-08-08 RX ADMIN — VENLAFAXINE 37.5 MG: 37.5 TABLET ORAL at 16:09

## 2019-08-08 RX ADMIN — VENLAFAXINE 37.5 MG: 37.5 TABLET ORAL at 08:21

## 2019-08-08 RX ADMIN — IPRATROPIUM BROMIDE AND ALBUTEROL SULFATE 3 ML: .5; 3 SOLUTION RESPIRATORY (INHALATION) at 00:28

## 2019-08-08 RX ADMIN — NORGESTIMATE AND ETHINYL ESTRADIOL 1 TABLET: KIT at 11:43

## 2019-08-08 RX ADMIN — PANTOPRAZOLE SODIUM 40 MG: 40 INJECTION, POWDER, LYOPHILIZED, FOR SOLUTION INTRAVENOUS at 08:22

## 2019-08-08 RX ADMIN — HEPARIN SODIUM 5000 UNITS: 5000 INJECTION INTRAVENOUS; SUBCUTANEOUS at 00:15

## 2019-08-08 RX ADMIN — SODIUM CHLORIDE 500 MG: 900 INJECTION, SOLUTION INTRAVENOUS at 04:36

## 2019-08-08 RX ADMIN — INSULIN LISPRO 2 UNITS: 100 INJECTION, SOLUTION INTRAVENOUS; SUBCUTANEOUS at 08:22

## 2019-08-08 NOTE — CONSULTS
[facility]                  Pulmonary, Critical Care, and Sleep Medicine     Name: Savita Galloway MRN: [id number]   : 1985 Hospital: [facility]    Date: 2019        [facility]                                              Consult requesting physician: Dr. Trista Krishnamurthy  Reason for Consult: abnormal CT, dyspnea, cough    Subjective/History:     Savita Galloway is a 27 y.o. female with PMHx significant for Hashimoto's thyroiditis, former smoker came to ER with dyspnea, cough and found to have abnormal CT, admitted for further w/u. Pt works for [employer]. 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 She quit smoking about 1 month ago when she started heaving some respiratory issues. Pt developed HA, body ache and not feeling well, dry cough and mild dyspnea about 10-12 days ago, thought to have some URI sx about, seen by PCP. Since then, c/w mostly dry cough, occ seldom scant white expectoration. Never had hemoptysis. Mild dyspnea without wheezing. Low grade temp with feeling temp max 100. Feeling cold intermittently at night, but no night sweats. No wt loss lymphadenopathy GI sx. Since last couple of days, felt some sinus pain. Former smoker 0.5 PPD x13 yrs, quit 2019  No travel to TB endemic area. No recent travel at all. 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 No birds/pets at home No mold at home. Not living on farm or exposed to farm animals or farm things. Denies IVDA or immunosuppressed stated. Never had HIV or hepatitis. No illicit drug use. Never had any lung disease. No skin rash joint issues.      ILD History:   Hx of connective tissue disease such as Lupus, Scleroderma, RA: none  Hx of Sarcoidosis: none  Hx of meds such as methotrexate, amiodarone, nitrofurantoin: never  Hx of cancer, chemotherapy, radiation: never  Hx of birds, chickens, farm animals: none  Hx of molds, hot tubs: no  Hx of GERD: no  Hx of aspiration: no      8/8/2019    No fever chills  Mild dry cough, no sputum production  Mild dyspnea without wheezing  Hemodynamics stable  No other issues overnight. Review of Systems:   HEENT: No epistaxis, no nasal drainage, no difficulty in swallowing, no redness in eyes  Respiratory: as above  Cardiovascular: no chest pain, no palpitations, no chronic leg edema, no syncope  Gastrointestinal: no abd pain, no vomiting, no diarrhea, no bleeding symptoms  Genitourinary: No urinary symptoms or hematuria  Integument/breast: No ulcers or rashes  Musculoskeletal:Neg  Neurological: No focal weakness, no seizures, no headaches  Behvioral/Psych: No anxiety, no depression  Constitutional: No fever, no chills, no weight loss, no night sweats       Immunization status:   Immunization History   Administered Date(s) Administered    Tdap 06/28/2019        Past Medical History:   Diagnosis Date    Hashimoto's disease         Past Surgical History:   Procedure Laterality Date    HX KNEE REPLACEMENT          History reviewed. No pertinent family history. Social History     Tobacco Use    Smoking status: Former Smoker    Smokeless tobacco: Never Used   Substance Use Topics    Alcohol use: Not Currently        Prior to Admission medications    Medication Sig Start Date End Date Taking? Authorizing Provider   levothyroxine (SYNTHROID) 75 mcg tablet Take  by mouth Daily (before breakfast). Yes Geena, MD Gisell   venlafaxine (EFFEXOR) 37.5 mg tablet Take 37.5 mg by mouth three (3) times daily. Yes Geena, MD Gisell   norgestimate-ethinyl estradiol (ORTHO TRI-CYCLEN, TRI-SPRINTEC) 0.18/0.215/0.25 mg-35 mcg (28) tab Take  by mouth.    Yes Geena, MD Gisell       Current Facility-Administered Medications   Medication Dose Route Frequency    insulin lispro (HUMALOG) injection   SubCUTAneous AC&HS    cefTRIAXone (ROCEPHIN) 1 g in sterile water (preservative free) 10 mL IV syringe  1 g IntraVENous Q24H    azithromycin (ZITHROMAX) 500 mg in 0.9% sodium chloride 250 mL IVPB  500 mg IntraVENous Q24H    pantoprazole (PROTONIX) 40 mg in sodium chloride 0.9% 10 mL injection  40 mg IntraVENous DAILY    arformoterol (BROVANA) neb solution 15 mcg  15 mcg Nebulization BID RT    budesonide (PULMICORT) 500 mcg/2 ml nebulizer suspension  500 mcg Nebulization BID RT    0.9% sodium chloride infusion  100 mL/hr IntraVENous CONTINUOUS    heparin (porcine) injection 5,000 Units  5,000 Units SubCUTAneous Q8H    methylPREDNISolone (PF) (Solu-MEDROL) injection 125 mg  125 mg IntraVENous Q8H    albuterol-ipratropium (DUO-NEB) 2.5 MG-0.5 MG/3 ML  3 mL Nebulization Q4H PRN    levothyroxine (SYNTHROID) tablet 75 mcg  75 mcg Oral ACB    norgestimate-ethinyl estradiol (ORTHO TRI-CYCLEN, TRI-SPRINTEC) 0.18/0.215/0.25 mg-35 mcg (28) tablet 1 Tab  1 Tab Oral DAILY    venlafaxine (EFFEXOR) tablet 37.5 mg  37.5 mg Oral TID         Objective:   Vital Signs:    Visit Vitals  /76   Pulse 91   Temp 99.5 °F (37.5 °C)   Resp 16   Ht 5' 5\" (1.651 m)   Wt 125.6 kg (277 lb)   LMP 2019   SpO2 90%   Breastfeeding? No   BMI 46.10 kg/m²       O2 Device: Nasal cannula   O2 Flow Rate (L/min): 2 l/min   Temp (24hrs), Av.9 °F (37.2 °C), Min:98.3 °F (36.8 °C), Max:99.5 °F (37.5 °C)       Intake/Output:   Last shift:       07 -  1900  In: 15 [I.V.:15]  Out: -   Last 3 shifts:  190 -  0700  In: 2171 [P.O.:400; I.V.:1171]  Out: 300 [Urine:300]    Intake/Output Summary (Last 24 hours) at 2019 0934  Last data filed at 2019 2612  Gross per 24 hour   Intake 1586 ml   Output 300 ml   Net 1286 ml       Physical Exam:     General/Neurology: Alert, Awake, NAD. Head:   Normocephalic, without obvious abnormality, atraumatic. Eye:   EOM intact, no scleral icterus, no pallor, no cyanosis.   Nose:   No sinus tenderness, no erythema, no induration, no discharge  Throat:  Lips, mucosa, and tongue normal. No oral thrush. Neck:   Supple, symmetric. No carotid bruit. No lymphadenopathy. Trachea midline  Lung:   Good air entry bilateral equal. No wheezing. No stridors. No prolongded expiration. No accessory muscle use. Bibasilar R>L few rales +. Heart:   Regular rate & rhythm. S1 S2 present. No murmur. No JVD. Abdomen:  Soft. NT. ND. Obese. Extremities:  No pedal edema. No cyanosis. No clubbing. Pulses: 2+ and symmetric in DP. Capillary refill: normal.   Lymphatic:  No cervical or supraclavicular palpable lymphadenopathy. Musculoskeletal: No joint swelling. No tenderness. Skin:   Color, texture, turgor normal. No rashes or lesions. Data:       Recent Results (from the past 24 hour(s))   EKG, 12 LEAD, INITIAL    Collection Time: 08/07/19  5:33 PM   Result Value Ref Range    Ventricular Rate 93 BPM    Atrial Rate 93 BPM    P-R Interval 118 ms    QRS Duration 84 ms    Q-T Interval 372 ms    QTC Calculation (Bezet) 462 ms    Calculated P Axis 36 degrees    Calculated R Axis 5 degrees    Calculated T Axis 57 degrees    Diagnosis       Normal sinus rhythm  Normal ECG  Confirmed by Marlo Babb MD, [facility] ([id number]) on 8/7/2019 7:22:39 PM     CBC WITH AUTOMATED DIFF    Collection Time: 08/07/19  5:41 PM   Result Value Ref Range    WBC 9.1 4.6 - 13.2 K/uL    RBC 4.45 4.20 - 5.30 M/uL    HGB 11.4 (L) 12.0 - 16.0 g/dL    HCT 35.7 35.0 - 45.0 %    MCV 80.2 74.0 - 97.0 FL    MCH 25.6 24.0 - 34.0 PG    MCHC 31.9 31.0 - 37.0 g/dL    RDW 15.2 (H) 11.6 - 14.5 %    PLATELET 306 328 - 603 K/uL    MPV 9.5 9.2 - 11.8 FL    NEUTROPHILS 59 40 - 73 %    LYMPHOCYTES 28 21 - 52 %    MONOCYTES 7 3 - 10 %    EOSINOPHILS 6 (H) 0 - 5 %    BASOPHILS 0 0 - 2 %    ABS. NEUTROPHILS 5.4 1.8 - 8.0 K/UL    ABS. LYMPHOCYTES 2.6 0.9 - 3.6 K/UL    ABS. MONOCYTES 0.6 0.05 - 1.2 K/UL    ABS. EOSINOPHILS 0.6 (H) 0.0 - 0.4 K/UL    ABS.  BASOPHILS 0.0 0.0 - 0.1 K/UL    DF AUTOMATED     CARDIAC PANEL,(CK, CKMB & TROPONIN)    Collection Time: 08/07/19  5:41 PM   Result Value Ref Range     26 - 192 U/L    CK - MB <1.0 <3.6 ng/ml    CK-MB Index  0.0 - 4.0 %     CALCULATION NOT PERFORMED WHEN RESULT IS BELOW LINEAR LIMIT    Troponin-I, QT <0.02 0.0 - 2.027 NG/ML   METABOLIC PANEL, COMPREHENSIVE    Collection Time: 08/07/19  5:41 PM   Result Value Ref Range    Sodium 141 136 - 145 mmol/L    Potassium 3.9 3.5 - 5.5 mmol/L    Chloride 107 100 - 111 mmol/L    CO2 27 21 - 32 mmol/L    Anion gap 7 3.0 - 18 mmol/L    Glucose 121 (H) 74 - 99 mg/dL    BUN 11 7.0 - 18 MG/DL    Creatinine 0.61 0.6 - 1.3 MG/DL    BUN/Creatinine ratio 18 12 - 20      GFR est AA >60 >60 ml/min/1.73m2    GFR est non-AA >60 >60 ml/min/1.73m2    Calcium 8.5 8.5 - 10.1 MG/DL    Bilirubin, total 0.2 0.2 - 1.0 MG/DL    ALT (SGPT) 21 13 - 56 U/L    AST (SGOT) 16 10 - 38 U/L    Alk.  phosphatase 85 45 - 117 U/L    Protein, total 7.1 6.4 - 8.2 g/dL    Albumin 3.0 (L) 3.4 - 5.0 g/dL    Globulin 4.1 (H) 2.0 - 4.0 g/dL    A-G Ratio 0.7 (L) 0.8 - 1.7     D DIMER    Collection Time: 08/07/19  5:41 PM   Result Value Ref Range    D DIMER 0.56 (H) <0.46 ug/ml(FEU)   URINALYSIS W/ RFLX MICROSCOPIC    Collection Time: 08/07/19  6:56 PM   Result Value Ref Range    Color YELLOW      Appearance CLEAR      Specific gravity 1.026 1.005 - 1.030      pH (UA) 7.0 5.0 - 8.0      Protein NEGATIVE  NEG mg/dL    Glucose NEGATIVE  NEG mg/dL    Ketone NEGATIVE  NEG mg/dL    Bilirubin NEGATIVE  NEG      Blood NEGATIVE  NEG      Urobilinogen 1.0 0.2 - 1.0 EU/dL    Nitrites NEGATIVE  NEG      Leukocyte Esterase NEGATIVE  NEG     HCG URINE, QL    Collection Time: 08/07/19  6:56 PM   Result Value Ref Range    HCG urine, QL NEGATIVE  NEG     CBC W/O DIFF    Collection Time: 08/08/19  5:35 AM   Result Value Ref Range    WBC 7.3 4.6 - 13.2 K/uL    RBC 4.02 (L) 4.20 - 5.30 M/uL    HGB 11.2 (L) 12.0 - 16.0 g/dL    HCT 34.0 (L) 35.0 - 45.0 %    MCV 84.6 74.0 - 97.0 FL    MCH 27.9 24.0 - 34.0 PG    MCHC 32.9 31.0 - 37.0 g/dL    RDW 16.0 (H) 11.6 - 14.5 %    PLATELET 885 972 - 764 K/uL    MPV 9.5 9.2 - 78.8 FL   METABOLIC PANEL, COMPREHENSIVE    Collection Time: 08/08/19  5:35 AM   Result Value Ref Range    Sodium 143 136 - 145 mmol/L    Potassium 4.3 3.5 - 5.5 mmol/L    Chloride 109 100 - 111 mmol/L    CO2 23 21 - 32 mmol/L    Anion gap 11 3.0 - 18 mmol/L    Glucose 184 (H) 74 - 99 mg/dL    BUN 10 7.0 - 18 MG/DL    Creatinine 0.72 0.6 - 1.3 MG/DL    BUN/Creatinine ratio 14 12 - 20      GFR est AA >60 >60 ml/min/1.73m2    GFR est non-AA >60 >60 ml/min/1.73m2    Calcium 8.9 8.5 - 10.1 MG/DL    Bilirubin, total 0.1 (L) 0.2 - 1.0 MG/DL    ALT (SGPT) 23 13 - 56 U/L    AST (SGOT) 16 10 - 38 U/L    Alk. phosphatase 83 45 - 117 U/L    Protein, total 7.3 6.4 - 8.2 g/dL    Albumin 2.9 (L) 3.4 - 5.0 g/dL    Globulin 4.4 (H) 2.0 - 4.0 g/dL    A-G Ratio 0.7 (L) 0.8 - 1.7     HEMOGLOBIN A1C W/O EAG    Collection Time: 08/08/19  5:35 AM   Result Value Ref Range    Hemoglobin A1c 5.8 (H) 4.2 - 5.6 %   GLUCOSE, POC    Collection Time: 08/08/19  7:59 AM   Result Value Ref Range    Glucose (POC) 152 (H) 70 - 110 mg/dL         Chemistry Recent Labs     08/08/19  0535 08/07/19  1741   * 121*    141   K 4.3 3.9    107   CO2 23 27   BUN 10 11   CREA 0.72 0.61   CA 8.9 8.5   AGAP 11 7   BUCR 14 18   AP 83 85   TP 7.3 7.1   ALB 2.9* 3.0*   GLOB 4.4* 4.1*   AGRAT 0.7* 0.7*        Lactic Acid No results found for: LAC  No results for input(s): LAC in the last 72 hours.      Liver Enzymes Protein, total   Date Value Ref Range Status   08/08/2019 7.3 6.4 - 8.2 g/dL Final     Albumin   Date Value Ref Range Status   08/08/2019 2.9 (L) 3.4 - 5.0 g/dL Final     Globulin   Date Value Ref Range Status   08/08/2019 4.4 (H) 2.0 - 4.0 g/dL Final     A-G Ratio   Date Value Ref Range Status   08/08/2019 0.7 (L) 0.8 - 1.7   Final     AST (SGOT)   Date Value Ref Range Status   08/08/2019 16 10 - 38 U/L Final     Comment: PLEASE NOTE NEW REFERENCE RANGE     Alk. phosphatase   Date Value Ref Range Status   08/08/2019 83 45 - 117 U/L Final     Recent Labs     08/08/19  0535 08/07/19  1741   TP 7.3 7.1   ALB 2.9* 3.0*   GLOB 4.4* 4.1*   AGRAT 0.7* 0.7*   SGOT 16 16   AP 83 85        CBC w/Diff Recent Labs     08/08/19  0535 08/07/19  1741   WBC 7.3 9.1   RBC 4.02* 4.45   HGB 11.2* 11.4*   HCT 34.0* 35.7    329   GRANS  --  59   LYMPH  --  28   EOS  --  6*        Cardiac Enzymes Lab Results   Component Value Date/Time     08/07/2019 05:41 PM    CKMB <1.0 08/07/2019 05:41 PM    CKND1  08/07/2019 05:41 PM     CALCULATION NOT PERFORMED WHEN RESULT IS BELOW LINEAR LIMIT    Mary Hammans <0.02 08/07/2019 05:41 PM        BNP No results found for: BNP, BNPP, XBNPT     Coagulation No results for input(s): PTP, INR, APTT in the last 72 hours. No lab exists for component: INREXT      Thyroid  No results found for: T4, T3U, TSH, TSHEXT    No results found for: T4     Urinalysis Lab Results   Component Value Date/Time    Color YELLOW 08/07/2019 06:56 PM    Appearance CLEAR 08/07/2019 06:56 PM    Specific gravity 1.026 08/07/2019 06:56 PM    pH (UA) 7.0 08/07/2019 06:56 PM    Protein NEGATIVE  08/07/2019 06:56 PM    Glucose NEGATIVE  08/07/2019 06:56 PM    Ketone NEGATIVE  08/07/2019 06:56 PM    Bilirubin NEGATIVE  08/07/2019 06:56 PM    Urobilinogen 1.0 08/07/2019 06:56 PM    Nitrites NEGATIVE  08/07/2019 06:56 PM    Leukocyte Esterase NEGATIVE  08/07/2019 06:56 PM        Micro  No results for input(s): SDES, CULT in the last 72 hours. No results for input(s): CULT in the last 72 hours. ABG No results for input(s): PHI, PHI, POC2, PCO2I, PO2, PO2I, HCO3, HCO3I, FIO2, FIO2I in the last 72 hours.      PFT       Ultrasound       LE Doppler       ECHO       CT (Most Recent) (CT chest reviewed by me) Results from Hospital Encounter encounter on 08/07/19   CTA CHEST W OR W WO CONT    Narrative EXAM: CTA chest    INDICATION: Chest pain    COMPARISON: None    TECHNIQUE: Axial CT imaging from the thoracic inlet through the diaphragm with  intravenous contrast. Coronal and sagittal MIP reformats were generated. One or  more dose reduction techniques were used on this CT: automated exposure control,  adjustment of the mAs and/or kVp according to patient size, and iterative  reconstruction techniques. The specific techniques used on this CT exam have  been documented in the patient's electronic medical record. Digital Imaging and  Communications in Medicine (DICOM) format image data are available to  nonaffiliated external healthcare facilities or entities on a secure, media  free, reciprocally searchable basis with patient authorization for at least a  12-month period after this study. _______________    FINDINGS: This study was repeated due to poor contrast opacification on first  attempt. EXAM QUALITY: Overall exam quality is satisfactory. Pulmonary arterial  enhancement is adequate with adequate breath hold and no significant artifact. PULMONARY ARTERIES: No evidence of pulmonary embolism. LYMPH Nodes: Multiple enlarged right hilar lymph nodes are seen measuring up to  1.2 cm. There are enlarged AP window lymph nodes measuring 13 mm. Subcentimeter  left hilar lymph nodes are seen. PLEURA: There are no pleural effusions    HEART: Normal in size without pericardial effusion. VASCULATURE/MEDIASTINUM: There is no aortic dissection. LUNGS: There are diffuse reticular nodular interstitial infiltrates with a  miliary-type appearance throughout the right lung and to a lesser degree in the  left lung. Differential diagnoses includes bacterial or viral pneumonia versus  miliary tuberculosis versus thyroid or renal metastasis. Correlate with clinical  picture. AIRWAY: Normal.    UPPER ABDOMEN: There is 11 mm calculus in the upper pole the left kidney. Otherwise visualized solid organs are unremarkable.     OTHER: No acute or aggressive osseous abnormalities identified. _______________      Impression IMPRESSION:    No evidence of a pulmonary embolism or aortic dissection. Mediastinal and hilar adenopathy. Diffuse reticular nodular interstitial infiltrates throughout both lungs with a  miliary type pattern with differential diagnoses including bacterial or viral  pneumonia versus miliary tuberculosis versus metastatic renal or thyroid  carcinoma. Correlate with clinical picture. Ronney Riedel PA informed 9:25 PM August 7, 2019. XR (Most Recent). CXR  reviewed by me and compared with previous CXR Results from Hospital Encounter encounter on 08/07/19   XR CHEST PA LAT    Narrative EXAM:  PA and Lateral Chest X-ray     INDICATION: Upper respiratory tract infection about one half weeks ago    COMPARISON: None    ___________    FINDINGS: Heart and mediastinal contours are within normal limits. Lungs are  clear of active disease. There are no pleural effusions. No acute osseous  findings. _____________         Impression IMPRESSION:  No acute process            IMPRESSION:   · B/l extensive diffuse R>L reticular nodular interstitial infiltrates with mediastinal/hilar lymphadenopathy: differentials: infection like viral/atypical bacterial/bacterial/TB/NTB etc, inflammatory like sarcoidosis/HP, malignancy like mets. Doubt other ILD like RB-ILD/DIP. · Mediastinal and hilar lymphadenopathy could be reactive to infection/inflammation, other differential includes lymphoma or malignancy.    · Peripheral eosinophilia  · Hx of annual PPD negative, last negative in 10/2018  · Hx of Hashimoto's thyroiditis   · Mild anemia   · Former smoker 0.5 PPD x13 yrs, quit 07/2019  ·   Patient Active Problem List   Diagnosis Code    Dyspnea R06.00    Hashimoto's disease E06.3    Lung infiltrate on CT R91.8    Ex-smoker Z87.891    Morbid obesity with BMI of 45.0-49.9, adult ([facility] [address] E66.01, Z68.42 ·           RECOMMENDATIONS:   Sputum gram stain, cx  Sputum AFB x3  Check urine Ag panel   Check rapid influenza test (?exposed to children at job)  Check thyroid USG  HIV pending  Check CRP, ESR, HP panel, ACE, QTB plus. Follow peripheral eosinophils. C/w empiric Rocephin/zithromax  D/c steroids for now until infection ruled out. D/c brovana and pulmicort. C/w duoneb prn. C/w isolation for now     Depending on above w/u, she may need bronchoscopy with BAL for cell count/PCP/cytology/viral cx/AFB/fungal/gram stain/cx and TBBx to look for granulomas/cancer and lung biopsy also for micro. FiO2 to keep SpO2 >=92%, HOB >=30 degree, aspiration precautions, aggressive pulmonary toileting, incentive spirometry, PT/OT eval and treat, mobilization. DVT Prophylaxis: heparin  GI Prophylaxis: protonix  Smoking cessation counseling done by me and spent >3 minutes on it. Other issues management by primary team and respective consultants. Further recommendations will be based on the patient's response to recommended treatment and results of the investigation ordered. Quality Care: PPI, DVT prophylaxis, HOB elevated, infection control all reviewed and addressed. Discussed with patient, radiologic work up showed, answered all questions to their satisfaction. Care plan discussed with nursing, Dr. Deanne Ray.  Gisela Allison MD  8/8/2019

## 2019-08-08 NOTE — H&P
History & Physical    Patient: Steph Paredes MRN: [id number]  CSN: [id number]    YOB: 1985  Age: [address] y.o. Sex: female      DOA: 8/7/2019  Primary Care Provider:  Destiny Tomas MD      Assessment/Plan     Patient Active Problem List   Diagnosis Code    Dyspnea R06.00       [address] y/o female admitted for dyspnea and hypoxia who was found to have a miliary pattern on chest CT.    -pulm consult and possible bronchoscopy  -r/o tuberculosis (sputum cx x3 w/ AFB)  -neg pressure isolation  -solumedrol 125 mg q8 hrs  -duonebs q6 hrs prn SOB/wheezing  -rocephin and azithromycin (will avoid fluoroquinolones while collecting sputum cx as this can cause a false neg AFB)  -consider thyroid U/S or abdominal CT if needed after pulmonology evaluation    Estimated length of stay : 3 nights    Sary Waddell MD  Nocturnist    ---------------------------------------------------------------------------------------------------------------------------    CC: dyspnea, cough       HPI:     Steph Paredes is a [address] y.o. female who presents with cough and dyspnea x12 days. She was treated for a URI by her PCP. No fever or sick contacts. No recent travel. She works for [employer] but is around healthy children. Her annual TB test in Oct 2018 was negative. Her fiance and son are not sick or coughing. No hemoptysis. Her PCP just increased her synthroid dose but has been feeling fine on the new dose. She quit smoking recently and has no history of asthma. Past Medical History:   Diagnosis Date    Hashimoto's disease        Past Surgical History:   Procedure Laterality Date    HX KNEE REPLACEMENT         History reviewed. No pertinent family history.     Social History     Socioeconomic History    Marital status: SINGLE     Spouse name: Not on file    Number of children: Not on file    Years of education: Not on file    Highest education level: Not on file   Tobacco Use    Smoking status: Former Smoker    Smokeless tobacco: Never Used   Substance and Sexual Activity    Alcohol use: Not Currently       Prior to Admission medications    Medication Sig Start Date End Date Taking? Authorizing Provider   levothyroxine (SYNTHROID) 75 mcg tablet Take  by mouth Daily (before breakfast). Yes Other, MD Gisell   venlafaxine (EFFEXOR) 37.5 mg tablet Take 37.5 mg by mouth three (3) times daily. Yes Other, MD Gisell   norgestimate-ethinyl estradiol (ORTHO TRI-CYCLEN, TRI-SPRINTEC) 0.18/0.215/0.25 mg-35 mcg (28) tab Take  by mouth. Yes Other, MD Gisell       No Known Allergies    Review of Systems  Gen: No fever, chills, malaise, weight loss/gain. Heent: No headache, rhinorrhea, epistaxis, ear pain, hearing loss, sinus pain, neck pain/stiffness, sore throat. Heart: No chest pain, palpitations, SCOTT, pnd, or orthopnea. Resp: +cough, wheezing and shortness of breath. GI: No nausea, vomiting, diarrhea, constipation, melena or hematochezia. : No urinary obstruction, dysuria or hematuria. Derm: No rash, new skin lesion or pruritis. Musc/skeletal: no bone or joint complaints. Vasc: No edema, cyanosis or claudication. Endo: No heat/cold intolerance, no polyuria,polydipsia or polyphagia. Neuro: No unilateral weakness, numbness, tingling. No seizures. Heme: No easy bruising or bleeding. Physical Exam:     Physical Exam:  Visit Vitals  /72 (BP 1 Location: Left arm, BP Patient Position: At rest)   Pulse 81   Temp 98.9 °F (37.2 °C)   Resp 15   Ht 5' 5\" (1.651 m)   Wt 125.6 kg (277 lb)   LMP 2019   SpO2 92%   Breastfeeding? No   BMI 46.10 kg/m²    O2 Flow Rate (L/min): 2 l/min O2 Device: Nasal cannula    Temp (24hrs), Av.9 °F (37.2 °C), Min:98.9 °F (37.2 °C), Max:99 °F (37.2 °C)     1901 -  0700  In: -   Out: 300 [Urine:300]   No intake/output data recorded. General:  Awake, cooperative, no distress, frequent coughing. Head:  Normocephalic, without obvious abnormality, atraumatic.    Eyes: Conjunctivae/corneas clear, sclera anicteric, EOMs intact. Neck: Supple, symmetrical, trachea midline, no adenopathy. Lungs:   Expiratory wheezes throughout b/l lung fields. Heart:  Regular rate and rhythm, S1, S2 normal, no murmur, click, rub or gallop. Abdomen: Soft, non-tender. Bowel sounds normal. No masses,  No organomegaly. Extremities: Extremities normal, atraumatic, no cyanosis or edema. Capillary refill normal.   Pulses: 2+ and symmetric all extremities. Skin: Skin color pink, turgor normal. No rashes or lesions   Neurologic: No focal motor or sensory deficit. Labs Reviewed: All lab results for the last 24 hours reviewed. Recent Results (from the past 24 hour(s))   EKG, 12 LEAD, INITIAL    Collection Time: 08/07/19  5:33 PM   Result Value Ref Range    Ventricular Rate 93 BPM    Atrial Rate 93 BPM    P-R Interval 118 ms    QRS Duration 84 ms    Q-T Interval 372 ms    QTC Calculation (Bezet) 462 ms    Calculated P Axis 36 degrees    Calculated R Axis 5 degrees    Calculated T Axis 57 degrees    Diagnosis       Normal sinus rhythm  Normal ECG  Confirmed by Devora Holbrook MD, [facility] ([id number]) on 8/7/2019 7:22:39 PM     CBC WITH AUTOMATED DIFF    Collection Time: 08/07/19  5:41 PM   Result Value Ref Range    WBC 9.1 4.6 - 13.2 K/uL    RBC 4.45 4.20 - 5.30 M/uL    HGB 11.4 (L) 12.0 - 16.0 g/dL    HCT 35.7 35.0 - 45.0 %    MCV 80.2 74.0 - 97.0 FL    MCH 25.6 24.0 - 34.0 PG    MCHC 31.9 31.0 - 37.0 g/dL    RDW 15.2 (H) 11.6 - 14.5 %    PLATELET 194 031 - 153 K/uL    MPV 9.5 9.2 - 11.8 FL    NEUTROPHILS 59 40 - 73 %    LYMPHOCYTES 28 21 - 52 %    MONOCYTES 7 3 - 10 %    EOSINOPHILS 6 (H) 0 - 5 %    BASOPHILS 0 0 - 2 %    ABS. NEUTROPHILS 5.4 1.8 - 8.0 K/UL    ABS. LYMPHOCYTES 2.6 0.9 - 3.6 K/UL    ABS. MONOCYTES 0.6 0.05 - 1.2 K/UL    ABS. EOSINOPHILS 0.6 (H) 0.0 - 0.4 K/UL    ABS.  BASOPHILS 0.0 0.0 - 0.1 K/UL    DF AUTOMATED     CARDIAC PANEL,(CK, CKMB & TROPONIN)    Collection Time: 08/07/19  5:41 PM Result Value Ref Range     26 - 192 U/L    CK - MB <1.0 <3.6 ng/ml    CK-MB Index  0.0 - 4.0 %     CALCULATION NOT PERFORMED WHEN RESULT IS BELOW LINEAR LIMIT    Troponin-I, QT <0.02 0.0 - 0.876 NG/ML   METABOLIC PANEL, COMPREHENSIVE    Collection Time: 08/07/19  5:41 PM   Result Value Ref Range    Sodium 141 136 - 145 mmol/L    Potassium 3.9 3.5 - 5.5 mmol/L    Chloride 107 100 - 111 mmol/L    CO2 27 21 - 32 mmol/L    Anion gap 7 3.0 - 18 mmol/L    Glucose 121 (H) 74 - 99 mg/dL    BUN 11 7.0 - 18 MG/DL    Creatinine 0.61 0.6 - 1.3 MG/DL    BUN/Creatinine ratio 18 12 - 20      GFR est AA >60 >60 ml/min/1.73m2    GFR est non-AA >60 >60 ml/min/1.73m2    Calcium 8.5 8.5 - 10.1 MG/DL    Bilirubin, total 0.2 0.2 - 1.0 MG/DL    ALT (SGPT) 21 13 - 56 U/L    AST (SGOT) 16 10 - 38 U/L    Alk.  phosphatase 85 45 - 117 U/L    Protein, total 7.1 6.4 - 8.2 g/dL    Albumin 3.0 (L) 3.4 - 5.0 g/dL    Globulin 4.1 (H) 2.0 - 4.0 g/dL    A-G Ratio 0.7 (L) 0.8 - 1.7     D DIMER    Collection Time: 08/07/19  5:41 PM   Result Value Ref Range    D DIMER 0.56 (H) <0.46 ug/ml(FEU)   URINALYSIS W/ RFLX MICROSCOPIC    Collection Time: 08/07/19  6:56 PM   Result Value Ref Range    Color YELLOW      Appearance CLEAR      Specific gravity 1.026 1.005 - 1.030      pH (UA) 7.0 5.0 - 8.0      Protein NEGATIVE  NEG mg/dL    Glucose NEGATIVE  NEG mg/dL    Ketone NEGATIVE  NEG mg/dL    Bilirubin NEGATIVE  NEG      Blood NEGATIVE  NEG      Urobilinogen 1.0 0.2 - 1.0 EU/dL    Nitrites NEGATIVE  NEG      Leukocyte Esterase NEGATIVE  NEG     HCG URINE, QL    Collection Time: 08/07/19  6:56 PM   Result Value Ref Range    HCG urine, QL NEGATIVE  NEG         Results   CTA CHEST W OR W WO CONT (Accession [id number]) (Order [id number])   Allergies      No Known Allergies   Exam Information     Status Exam Begun  Exam Ended    Final [99] 8/07/2019 20:05 8/07/2019 20:30   Result Information     Status: Final result (Exam End: 8/7/2019 20:30) Provider Status: Open   Study Result     EXAM: CTA chest     INDICATION: Chest pain     COMPARISON: None     TECHNIQUE: Axial CT imaging from the thoracic inlet through the diaphragm with  intravenous contrast. Coronal and sagittal MIP reformats were generated. One or  more dose reduction techniques were used on this CT: automated exposure control,  adjustment of the mAs and/or kVp according to patient size, and iterative  reconstruction techniques. The specific techniques used on this CT exam have  been documented in the patient's electronic medical record. Digital Imaging and  Communications in Medicine (DICOM) format image data are available to  nonaffiliated external healthcare facilities or entities on a secure, media  free, reciprocally searchable basis with patient authorization for at least a  12-month period after this study.     _______________     FINDINGS: This study was repeated due to poor contrast opacification on first  attempt.     EXAM QUALITY: Overall exam quality is satisfactory. Pulmonary arterial  enhancement is adequate with adequate breath hold and no significant artifact.     PULMONARY ARTERIES: No evidence of pulmonary embolism.     LYMPH Nodes: Multiple enlarged right hilar lymph nodes are seen measuring up to  1.2 cm. There are enlarged AP window lymph nodes measuring 13 mm. Subcentimeter  left hilar lymph nodes are seen.     PLEURA: There are no pleural effusions     HEART: Normal in size without pericardial effusion.     VASCULATURE/MEDIASTINUM: There is no aortic dissection.     LUNGS: There are diffuse reticular nodular interstitial infiltrates with a  miliary-type appearance throughout the right lung and to a lesser degree in the  left lung. Differential diagnoses includes bacterial or viral pneumonia versus  miliary tuberculosis versus thyroid or renal metastasis.  Correlate with clinical  picture.     AIRWAY: Normal.     UPPER ABDOMEN: There is 11 mm calculus in the upper pole the left kidney. Otherwise visualized solid organs are unremarkable.     OTHER: No acute or aggressive osseous abnormalities identified.     _______________     IMPRESSION  IMPRESSION:     No evidence of a pulmonary embolism or aortic dissection.     Mediastinal and hilar adenopathy.     Diffuse reticular nodular interstitial infiltrates throughout both lungs with a  miliary type pattern with differential diagnoses including bacterial or viral  pneumonia versus miliary tuberculosis versus metastatic renal or thyroid  carcinoma. Correlate with clinical picture.     Giancarlo RAMAN informed 9:25 PM August 7, 2019.

## 2019-08-08 NOTE — PROGRESS NOTES
Hospitalist Progress Note-critical care note     Patient: Beau Becerra MRN: [id number]  CSN: [id number]    YOB: 1985  Age: 27 y.o. Sex: female    DOA: 8/7/2019 LOS:  LOS: 1 day            Chief complaint: dyspnea, lung infiltration,  Ex smoker , obesity     Assessment/Plan         Hospital Problems  Date Reviewed: 8/8/2019          Codes Class Noted POA    Hashimoto's disease ICD-10-CM: E06.3  ICD-9-CM: 245.2  8/8/2019 Unknown        Lung infiltrate on CT ICD-10-CM: R91.8  ICD-9-CM: 793.19  8/8/2019 Unknown        Ex-smoker ICD-10-CM: Q34.389  ICD-9-CM: V15.82  8/8/2019 Unknown        Morbid obesity with BMI of 45.0-49.9, adult [facility]) ICD-10-CM: E66.01, Z68.42  ICD-9-CM: 278.01, V85.42  8/8/2019 Unknown        * (Principal) Dyspnea ICD-10-CM: R06.00  ICD-9-CM: 786.09  8/7/2019 Yes            Dyspnea   Cta no pe, but lung infiltration vs TB and met disease from thyroid and renal   Will have pulm on board  Will send (sputum cx x3 w/ AFB) to r/o tb. No risk factors   -neg pressure isolation till r./p  Will continue steroid, like undiagnosed copd,will give some breathing tx, due to wheezing   Will have ct abdomen and thyroid ultrasound   Will have echo     Lung infiltrate on ct    -rocephin and azithromycin   -consider thyroid U/S or abdominal CT if needed after pulmonology evaluation    Hypothyroidism   tsh is pending, continue synthroid    Morbid obesity   bmi 46   Need lifestyle modification     Ex-smoker  Will provide nicotine patch as needed      Subjective: fhx of cancer, but not thyroid cancer/lymphoma. Still cough, no blood. Sob better     rn : no acute issue     Disposition :tbd,   Review of systems:    General: No fevers or chills. Cardiovascular: No chest pain or pressure. No palpitations. Pulmonary: shortness of breath better    Gastrointestinal: No nausea, vomiting.      Vital signs/Intake and Output:  Visit Vitals  /76   Pulse 91   Temp 99.5 °F (37.5 °C)   Resp 16   Ht 5' 5\" (1.651 m)   Wt 125.6 kg (277 lb)   SpO2 90%   Breastfeeding? No   BMI 46.10 kg/m²     Current Shift:  08/08 0701 - 08/08 1900  In: 15 [I.V.:15]  Out: -   Last three shifts:  08/06 1901 - 08/08 0700  In: 7232 [P.O.:400; I.V.:1171]  Out: 300 [Urine:300]    Physical Exam:  General: WD, WN. Alert, cooperative, no acute distress    HEENT: NC, Atraumatic. PERRLA, anicteric sclerae. Lungs: CTA Bilaterally. + Wheezing, no Rhonchi/Rales. Heart:  Regular  rhythm,  No murmur, No Rubs, No Gallops  Abdomen: Soft, Non distended, Non tender.  +Bowel sounds,   Extremities: No c/c/e  Psych:   Not anxious or agitated. Neurologic:  No acute neurological deficit. Labs: Results:       Chemistry Recent Labs     08/08/19  0535 08/07/19  1741   * 121*    141   K 4.3 3.9    107   CO2 23 27   BUN 10 11   CREA 0.72 0.61   CA 8.9 8.5   AGAP 11 7   BUCR 14 18   AP 83 85   TP 7.3 7.1   ALB 2.9* 3.0*   GLOB 4.4* 4.1*   AGRAT 0.7* 0.7*      CBC w/Diff Recent Labs     08/08/19  0535 08/07/19  1741   WBC 7.3 9.1   RBC 4.02* 4.45   HGB 11.2* 11.4*   HCT 34.0* 35.7    329   GRANS  --  59   LYMPH  --  28   EOS  --  6*      Cardiac Enzymes Recent Labs     08/07/19  1741      CKND1 CALCULATION NOT PERFORMED WHEN RESULT IS BELOW LINEAR LIMIT      Coagulation No results for input(s): PTP, INR, APTT in the last 72 hours. No lab exists for component: INREXT, INREXT    Lipid Panel No results found for: CHOL, CHOLPOCT, CHOLX, CHLST, CHOLV, 920710, HDL, LDL, LDLC, DLDLP, 164017, VLDLC, VLDL, TGLX, TRIGL, TRIGP, TGLPOCT, CHHD, CHHDX   BNP No results for input(s): BNPP in the last 72 hours.    Liver Enzymes Recent Labs     08/08/19  0535   TP 7.3   ALB 2.9*   AP 83   SGOT 16      Thyroid Studies No results found for: T4, T3U, TSH, TSHEXT, TSHEXT     Procedures/imaging: see electronic medical records for all procedures/Xrays and details which were not copied into this note but were reviewed prior to creation of Claudean Canning, MD

## 2019-08-08 NOTE — PROGRESS NOTES
RESPIRATORY NOTE:  Pt with dry raspy cough, nonproductive at this time after PRN duoneb tx given, encouraged pt to cough, continue to try for sputum specimen, RN aware.

## 2019-08-08 NOTE — ROUTINE PROCESS
TRANSFER - IN REPORT: 
 
Verbal report received from Adolfo Ritchie RN(name) on Aniya Bliss  being received from ER(unit) for routine progression of care Report consisted of patients Situation, Background, Assessment and  
Recommendations(SBAR). Information from the following report(s) SBAR, Kardex, ED Summary, Intake/Output, MAR and Recent Results was reviewed with the receiving nurse. Opportunity for questions and clarification was provided. Assessment completed upon patients arrival to unit and care assumed. 0755 Bedside and Verbal shift change report given to MATTHEW Grier RN (oncoming nurse) by Jim Lewis RN 
 (offgoing nurse). Report included the following information SBAR, Kardex, Intake/Output, MAR and Recent Results.

## 2019-08-08 NOTE — PROGRESS NOTES
7201 Pt on tele montior 40. Per tech, rhythm is NSR. Pt denies chest pain this AM and feels her breathing is better after taking breathing tx. No wheezing at this time. 1050 Flu swab and urine specimen were sent down to lab. Also, pt's norgestimate-ethinyl estradiol was given to pharmacist.    3607 [id number] Pt was sent down for CT and echo. 1150 Pt returned to floor. Informed respiratory about stat AFB. [address] precaution for possible flu. Order placed. 1500 Informed Dr. Bree Aguilar that flu test came back influenza A positive. Also informed her that solumedrol was d/c'd by Dr. Chelsi Amaral this AM. Recommended to stop bg checks as pt is not diabetic. Per Dr. Bree Aguilar, discontinue POC glucose and humalog. 1629 Pt sent down to ultrasound. 1710 Pt returned from ultrasound. 1818 AFB sputum sample was sent to lab. 1930 Bedside and Verbal shift change report given to AFUA Borjas RN (oncoming nurse) by Andrey Cee RN  (offgoing nurse). Report included the following information SBAR, Intake/Output and MAR. SHIFT SUMMARY: Pt's abd CT, echocardiogram, and thyroid US was completed today. Rapid flu test resulted as positive and TB ruleout is still pending, so airborne precautions will continue. Pt is voiding, but did not have BM. Denies pain. Good appetite.

## 2019-08-08 NOTE — PROGRESS NOTES
Reason for Admission:   dyspnea, cough                   RRAT Score:     Low; 5                Plan for utilizing home health:    unlikely                     Current Advanced Directive/Advance Care Plan:  Not on file                         Transition of Care Plan:   Physician follow up                   Chart reviewed. Per H&P Nannette Stallworth is a 27 y.o. female who presents with cough and dyspnea x12 days. She was treated for a URI by her PCP. No fever or sick contacts. No recent travel. She works for [employer] but is around healthy children. Her annual TB test in Oct 2018 was negative. Her fiance and son are not sick or coughing. No hemoptysis. Her PCP just increased her synthroid dose but has been feeling fine on the new dose. She quit smoking recently and has no history of asthma. \"    Currently ruling out TB. Please encourage ambulation as appropriate to assist with transition of care needs. CM continuing to follow and asssit. Care Management Interventions  PCP Verified by CM: Yes  Mode of Transport at Discharge: Other (see comment)(family/friend)  Transition of Care Consult (CM Consult): Discharge Planning  Health Maintenance Reviewed: Yes  Current Support Network:  Other, Family Lives Nearby  Confirm Follow Up Transport: Self  Discharge Location  Discharge Placement: Home with family assistance

## 2019-08-08 NOTE — PROGRESS NOTES
Patient was visited by [facility] who offered Pastoral Care support, and devotional material for patient. Chaplains remain available to provide spiritual support to patient and family as needed and requested. Rev.  Mindi Suhlain

## 2019-08-08 NOTE — ROUTINE PROCESS
TRANSFER - OUT REPORT: 
 
Verbal report given to Lucio Fajardo RN on Adela Son  being transferred to [facility] (remote telemetry unit) for routine progression of care Report consisted of patients Situation, Background, Assessment and  
Recommendations(SBAR). Information from the following report(s) SBAR, ED Summary and MAR was reviewed with the receiving nurse. Lines:  
Peripheral IV 08/07/19 Right Antecubital (Active) Site Assessment Clean, dry, & intact 8/7/2019  5:44 PM  
Phlebitis Assessment 0 8/7/2019  5:44 PM  
Infiltration Assessment 0 8/7/2019  5:44 PM  
Dressing Status Clean, dry, & intact 8/7/2019  5:44 PM  
  
 
Opportunity for questions and clarification was provided. Patient transported with: 
 Monitor Tech

## 2019-08-08 NOTE — PROGRESS NOTES
Pt received in room via wheelchair, A/O x4, SOB on exertion, on cardiac monitor. VS taken, denied pain. Pt's O2 sat on RA at 88-89%, placed on 2 li O2 and IS provided. Pt's O2 sat now at 91-94%. Respiratory therapist paged for Neb treatment as ordered prn    0630 Encouraged use of IS. Up ad urvashi to bathroom, voiding, denied pain. SCD's on while in bed.  Instructed pt not to take own meds - will need to be checked by pharmacy and okay by MD first. Shift uneventful

## 2019-08-09 PROBLEM — J10.1 INFLUENZA A: Status: ACTIVE | Noted: 2019-08-09

## 2019-08-09 LAB
ACE SERPL-CCNC: 46 U/L (ref 14–82)
ALBUMIN SERPL-MCNC: 2.9 G/DL (ref 3.4–5)
ALBUMIN/GLOB SERPL: 0.8 {RATIO} (ref 0.8–1.7)
ALP SERPL-CCNC: 67 U/L (ref 45–117)
ALT SERPL-CCNC: 20 U/L (ref 13–56)
ANION GAP SERPL CALC-SCNC: 6 MMOL/L (ref 3–18)
AST SERPL-CCNC: 11 U/L (ref 10–38)
BASOPHILS # BLD: 0 K/UL (ref 0–0.1)
BASOPHILS NFR BLD: 0 % (ref 0–2)
BILIRUB SERPL-MCNC: 0.2 MG/DL (ref 0.2–1)
BUN SERPL-MCNC: 14 MG/DL (ref 7–18)
BUN/CREAT SERPL: 24 (ref 12–20)
CALCIUM SERPL-MCNC: 8.9 MG/DL (ref 8.5–10.1)
CHLORIDE SERPL-SCNC: 109 MMOL/L (ref 100–111)
CO2 SERPL-SCNC: 27 MMOL/L (ref 21–32)
CREAT SERPL-MCNC: 0.58 MG/DL (ref 0.6–1.3)
CRP SERPL HS-MCNC: >9.5 MG/L
DIFFERENTIAL METHOD BLD: ABNORMAL
EOSINOPHIL # BLD: 0 K/UL (ref 0–0.4)
EOSINOPHIL NFR BLD: 0 % (ref 0–5)
ERYTHROCYTE [DISTWIDTH] IN BLOOD BY AUTOMATED COUNT: 15.3 % (ref 11.6–14.5)
GLOBULIN SER CALC-MCNC: 3.8 G/DL (ref 2–4)
GLUCOSE SERPL-MCNC: 107 MG/DL (ref 74–99)
HCT VFR BLD AUTO: 33.4 % (ref 35–45)
HGB BLD-MCNC: 10.9 G/DL (ref 12–16)
HIV 1+2 AB+HIV1 P24 AG SERPL QL IA: NONREACTIVE
HIV12 RESULT COMMENT, HHIVC: NORMAL
LYMPHOCYTES # BLD: 2.7 K/UL (ref 0.9–3.6)
LYMPHOCYTES NFR BLD: 18 % (ref 21–52)
MCH RBC QN AUTO: 27.6 PG (ref 24–34)
MCHC RBC AUTO-ENTMCNC: 32.6 G/DL (ref 31–37)
MCV RBC AUTO: 84.6 FL (ref 74–97)
MONOCYTES # BLD: 1.2 K/UL (ref 0.05–1.2)
MONOCYTES NFR BLD: 8 % (ref 3–10)
NEUTS SEG # BLD: 10.9 K/UL (ref 1.8–8)
NEUTS SEG NFR BLD: 74 % (ref 40–73)
PLATELET # BLD AUTO: 348 K/UL (ref 135–420)
PMV BLD AUTO: 9.7 FL (ref 9.2–11.8)
POTASSIUM SERPL-SCNC: 4.4 MMOL/L (ref 3.5–5.5)
PROT SERPL-MCNC: 6.7 G/DL (ref 6.4–8.2)
RBC # BLD AUTO: 3.95 M/UL (ref 4.2–5.3)
SODIUM SERPL-SCNC: 142 MMOL/L (ref 136–145)
WBC # BLD AUTO: 14.8 K/UL (ref 4.6–13.2)

## 2019-08-09 PROCEDURE — 94640 AIRWAY INHALATION TREATMENT: CPT

## 2019-08-09 PROCEDURE — 36415 COLL VENOUS BLD VENIPUNCTURE: CPT

## 2019-08-09 PROCEDURE — 65270000029 HC RM PRIVATE

## 2019-08-09 PROCEDURE — 94760 N-INVAS EAR/PLS OXIMETRY 1: CPT

## 2019-08-09 PROCEDURE — C9113 INJ PANTOPRAZOLE SODIUM, VIA: HCPCS | Performed by: FAMILY MEDICINE

## 2019-08-09 PROCEDURE — 74011250636 HC RX REV CODE- 250/636: Performed by: FAMILY MEDICINE

## 2019-08-09 PROCEDURE — 86480 TB TEST CELL IMMUN MEASURE: CPT

## 2019-08-09 PROCEDURE — 74011250637 HC RX REV CODE- 250/637: Performed by: HOSPITALIST

## 2019-08-09 PROCEDURE — 74011250637 HC RX REV CODE- 250/637: Performed by: FAMILY MEDICINE

## 2019-08-09 PROCEDURE — 80053 COMPREHEN METABOLIC PANEL: CPT

## 2019-08-09 PROCEDURE — 74011000250 HC RX REV CODE- 250: Performed by: FAMILY MEDICINE

## 2019-08-09 PROCEDURE — 77010033678 HC OXYGEN DAILY

## 2019-08-09 PROCEDURE — 85025 COMPLETE CBC W/AUTO DIFF WBC: CPT

## 2019-08-09 RX ADMIN — CEFTRIAXONE 1 G: 1 INJECTION, POWDER, FOR SOLUTION INTRAMUSCULAR; INTRAVENOUS at 03:39

## 2019-08-09 RX ADMIN — VENLAFAXINE 37.5 MG: 37.5 TABLET ORAL at 10:38

## 2019-08-09 RX ADMIN — IPRATROPIUM BROMIDE AND ALBUTEROL SULFATE 3 ML: .5; 3 SOLUTION RESPIRATORY (INHALATION) at 11:27

## 2019-08-09 RX ADMIN — VENLAFAXINE 37.5 MG: 37.5 TABLET ORAL at 21:40

## 2019-08-09 RX ADMIN — PANTOPRAZOLE SODIUM 40 MG: 40 INJECTION, POWDER, LYOPHILIZED, FOR SOLUTION INTRAVENOUS at 10:38

## 2019-08-09 RX ADMIN — VENLAFAXINE 37.5 MG: 37.5 TABLET ORAL at 16:54

## 2019-08-09 RX ADMIN — OSELTAMIVIR PHOSPHATE 75 MG: 75 CAPSULE ORAL at 10:38

## 2019-08-09 RX ADMIN — NORGESTIMATE AND ETHINYL ESTRADIOL 1 TABLET: KIT at 10:39

## 2019-08-09 RX ADMIN — HEPARIN SODIUM 5000 UNITS: 5000 INJECTION INTRAVENOUS; SUBCUTANEOUS at 15:00

## 2019-08-09 RX ADMIN — SODIUM CHLORIDE 500 MG: 900 INJECTION, SOLUTION INTRAVENOUS at 03:40

## 2019-08-09 RX ADMIN — HEPARIN SODIUM 5000 UNITS: 5000 INJECTION INTRAVENOUS; SUBCUTANEOUS at 21:40

## 2019-08-09 RX ADMIN — HEPARIN SODIUM 5000 UNITS: 5000 INJECTION INTRAVENOUS; SUBCUTANEOUS at 06:28

## 2019-08-09 RX ADMIN — OSELTAMIVIR PHOSPHATE 75 MG: 75 CAPSULE ORAL at 21:40

## 2019-08-09 RX ADMIN — LEVOTHYROXINE SODIUM 75 MCG: 75 TABLET ORAL at 06:30

## 2019-08-09 NOTE — PROGRESS NOTES
[facility]                  Pulmonary, Critical Care, and Sleep Medicine     Name: Dexter Ibrahim MRN: [id number]   : 1985 Hospital: [facility] [place]    Date: 2019        [facility]                                              Consult requesting physician: Dr. Stephen Drake  Reason for Consult: abnormal CT, dyspnea, cough    Subjective/History:     Dexter Ibrahim is a 27 y.o. female with PMHx significant for Hashimoto's thyroiditis, former smoker came to ER with dyspnea, cough and found to have abnormal CT, found to have influenza A positive. 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 She quit smoking about 1 month ago when she started heaving some respiratory issues. Pt developed HA, body ache and not feeling well, dry cough and mild dyspnea about 10-12 days ago, thought to have some URI sx about, seen by PCP. Since then, c/w mostly dry cough, occ seldom scant white expectoration. Never had hemoptysis. Mild dyspnea without wheezing. Low grade temp with feeling temp max 100. Feeling cold intermittently at night, but no night sweats. No wt loss lymphadenopathy GI sx. Since last couple of days, felt some sinus pain. Former smoker 0.5 PPD x13 yrs, quit 2019  No travel to TB endemic area. No recent travel at all. 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 No birds/pets at home No mold at home. Not living on farm or exposed to farm animals or farm things. Denies IVDA or immunosuppressed stated. Never had HIV or hepatitis. No illicit drug use. Never had any lung disease. No skin rash joint issues.      ILD History:   Hx of connective tissue disease such as Lupus, Scleroderma, RA: none  Hx of Sarcoidosis: none  Hx of meds such as methotrexate, amiodarone, nitrofurantoin: never  Hx of cancer, chemotherapy, radiation: never  Hx of birds, chickens, farm animals: none  Hx of molds, hot tubs: no  Hx of GERD: no  Hx of aspiration: no      8/9/2019  Feeling better   No fever chills  WBC increased after steroids yesterday  Influenza A positive  Sputum cx few bactermia  ESR elevated 67  Mild dry cough, no sputum production  No dyspnea   Hemodynamics stable  No other issues overnight. Review of Systems:   HEENT: No epistaxis, no nasal drainage, no difficulty in swallowing, no redness in eyes  Respiratory: as above  Cardiovascular: no chest pain, no palpitations, no chronic leg edema, no syncope  Gastrointestinal: no abd pain, no vomiting, no diarrhea, no bleeding symptoms  Genitourinary: No urinary symptoms or hematuria  Integument/breast: No ulcers or rashes  Musculoskeletal:Neg  Neurological: No focal weakness, no seizures, no headaches  Behvioral/Psych: No anxiety, no depression  Constitutional: No fever, no chills, no weight loss, no night sweats       Immunization status:   Immunization History   Administered Date(s) Administered    Tdap 06/28/2019        Past Medical History:   Diagnosis Date    Hashimoto's disease         Past Surgical History:   Procedure Laterality Date    HX KNEE REPLACEMENT          History reviewed. No pertinent family history. Social History     Tobacco Use    Smoking status: Former Smoker    Smokeless tobacco: Never Used   Substance Use Topics    Alcohol use: Not Currently        Prior to Admission medications    Medication Sig Start Date End Date Taking? Authorizing Provider   levothyroxine (SYNTHROID) 75 mcg tablet Take  by mouth Daily (before breakfast). Yes Other, MD Gisell   venlafaxine (EFFEXOR) 37.5 mg tablet Take 37.5 mg by mouth three (3) times daily. Yes Other, MD Gisell   norgestimate-ethinyl estradiol (ORTHO TRI-CYCLEN, TRI-SPRINTEC) 0.18/0.215/0.25 mg-35 mcg (28) tab Take  by mouth.    Yes Geena, MD Gisell       Current Facility-Administered Medications   Medication Dose Route Frequency    cefTRIAXone (ROCEPHIN) 1 g in sterile water (preservative free) 10 mL IV syringe  1 g IntraVENous Q24H    azithromycin (ZITHROMAX) 500 mg in 0.9% sodium chloride 250 mL IVPB  500 mg IntraVENous Q24H    pantoprazole (PROTONIX) 40 mg in sodium chloride 0.9% 10 mL injection  40 mg IntraVENous DAILY    norgestimate-ethinyl estradiol (ORTHO-CYCLEN, SPRINTEC) 0.25-35 mg-mcg per tablet 1 Tab (Patient Supplied)  1 Tab Oral DAILY    oseltamivir (TAMIFLU) capsule 75 mg  75 mg Oral BID    heparin (porcine) injection 5,000 Units  5,000 Units SubCUTAneous Q8H    albuterol-ipratropium (DUO-NEB) 2.5 MG-0.5 MG/3 ML  3 mL Nebulization Q4H PRN    levothyroxine (SYNTHROID) tablet 75 mcg  75 mcg Oral ACB    venlafaxine (EFFEXOR) tablet 37.5 mg  37.5 mg Oral TID         Objective:   Vital Signs:    Visit Vitals  /74   Pulse 68   Temp 98 °F (36.7 °C)   Resp 16   Ht 5' 5\" (1.651 m)   Wt 125.6 kg (277 lb)   LMP 2019   SpO2 94%   Breastfeeding? No   BMI 46.10 kg/m²       O2 Device: Nasal cannula   O2 Flow Rate (L/min): 2 l/min   Temp (24hrs), Av.8 °F (37.1 °C), Min:98 °F (36.7 °C), Max:99.6 °F (37.6 °C)       Intake/Output:   Last shift:      No intake/output data recorded. Last 3 shifts:  1901 -  0700  In: 9337 [P.O.:400; I.V.:1186]  Out: 500 [Urine:500]    Intake/Output Summary (Last 24 hours) at 2019 0823  Last data filed at 2019 1050  Gross per 24 hour   Intake 15 ml   Output 200 ml   Net -185 ml       Physical Exam:     General/Neurology: Alert, Awake, NAD. Head:   Normocephalic, without obvious abnormality, atraumatic. Eye:   EOM intact, no scleral icterus, no pallor, no cyanosis. Nose:   No sinus tenderness, no erythema, no induration, no discharge  Throat:  Lips, mucosa, and tongue normal. No oral thrush. Neck:   Supple, symmetric. No carotid bruit. No lymphadenopathy. Trachea midline  Lung:   Good air entry bilateral equal. No wheezing. No stridors.  No prolongded expiration. No accessory muscle use. Bibasilar R>L few rales +. Heart:   Regular rate & rhythm. S1 S2 present. No murmur. No JVD. Abdomen:  Soft. NT. ND. Obese. Extremities:  No pedal edema. No cyanosis. No clubbing. Pulses: 2+ and symmetric in DP. Capillary refill: normal.   Lymphatic:  No cervical or supraclavicular palpable lymphadenopathy. Musculoskeletal: No joint swelling. No tenderness. Skin:   Color, texture, turgor normal. No rashes or lesions.        Data:       Recent Results (from the past 24 hour(s))   INFLUENZA A & B AG (RAPID TEST)    Collection Time: 08/08/19 10:40 AM   Result Value Ref Range    Influenza A Antigen POSITIVE (A) NEG      Influenza B Antigen NEGATIVE  NEG     LEGIONELLA PNEUMOPHILA AG, URINE    Collection Time: 08/08/19 10:50 AM   Result Value Ref Range    Legionella Ag, urine NEGATIVE  NEG     STREP PNEUMO AG, URINE    Collection Time: 08/08/19 10:50 AM   Result Value Ref Range    Strep pneumo Ag, urine NEGATIVE  NEG     ECHO ADULT COMPLETE    Collection Time: 08/08/19 11:25 AM   Result Value Ref Range    AO ASC D 3.35 cm    Aortic Valve Systolic Peak Velocity 317.96 cm/s    AoV VTI 32.96 cm    Aortic Valve Area by Continuity of Peak Velocity 2.3 cm2    Aortic Valve Area by Continuity of VTI 2.4 cm2    AoV PG 12.6 mmHg    LVIDd 4.44 3.9 - 5.3 cm    LVPWd 1.41 (A) 0.6 - 0.9 cm    LVIDs 3.03 cm    IVSd 1.38 (A) 0.6 - 0.9 cm    LV ED Vol A2C 26.2 mL    LV ES Vol A4C 21.8 mL    LV ES Vol BP 18.6 (A) 19 - 49 mL    LVOT d 1.97 cm    LVOT Peak Velocity 131.58 cm/s    LVOT Peak Gradient 6.9 mmHg    LVOT VTI 25.53 cm    LV E' Septal Velocity 14.00 cm/s    LV E' Lateral Velocity 16.00 cm/s    MVA (PHT) 2.9 cm2    MV A Titi 66.82 cm/s    MV E Titi 95.57 cm/s    MV E/A 1.43     RVIDd 3.98 cm    Aortic Valve Systolic Mean Gradient 7.2 mmHg    BP EF 58.4 55 - 100 %    LV Ejection Fraction MOD 4C 65 %    LV Ejection Fraction MOD 2C 41 %    LA Vol 4C 36.96 22 - 52 mL    LA Vol 2C 71.36 (A) 22 - 52 mL    LV Mass .3 (A) 67 - 162 g    LV Mass AL Index 126.9 43 - 95 g/m2    E/E' lateral 5.97     E/E' septal 6.83     IVC proximal 2.00 cm    E/E' ratio (averaged) 6.40     LV ES Vol A2C 15.5 mL    LVES Vol Index BP 8.2 mL/m2    LV ED Vol A4C 62.5 mL    LVED Vol Index BP 19.6 mL/m2    Mitral Valve E Wave Deceleration Time 265.3 ms    Mitral Valve Pressure Half-time 76.9 ms    Left Atrium Major Axis 4.43 cm    LV ED Vol BP 44.6 (A) 56 - 104 ml    LA Vol Index 31.41 16 - 28 ml/m2    LA Vol Index 16.27 16 - 28 ml/m2    LVED Vol Index A4C 27.5 mL/m2    LVED Vol Index A2C 11.5 mL/m2    LVES Vol Index A4C 9.6 mL/m2    LVES Vol Index A2C 6.8 mL/m2    LA Volume 59.02 22 - 52 mL    Right Atrial Area 4C 10.09 cm2    Ao Root D 3.06 cm    LA Vol Index 25.98 16 - 28 ml/m2   GLUCOSE, POC    Collection Time: 08/08/19 11:42 AM   Result Value Ref Range    Glucose (POC) 138 (H) 70 - 110 mg/dL   SED RATE (ESR)    Collection Time: 08/08/19  3:45 PM   Result Value Ref Range    Sed rate, automated 67 (H) 0 - 20 mm/hr   METABOLIC PANEL, COMPREHENSIVE    Collection Time: 08/09/19  3:50 AM   Result Value Ref Range    Sodium 142 136 - 145 mmol/L    Potassium 4.4 3.5 - 5.5 mmol/L    Chloride 109 100 - 111 mmol/L    CO2 27 21 - 32 mmol/L    Anion gap 6 3.0 - 18 mmol/L    Glucose 107 (H) 74 - 99 mg/dL    BUN 14 7.0 - 18 MG/DL    Creatinine 0.58 (L) 0.6 - 1.3 MG/DL    BUN/Creatinine ratio 24 (H) 12 - 20      GFR est AA >60 >60 ml/min/1.73m2    GFR est non-AA >60 >60 ml/min/1.73m2    Calcium 8.9 8.5 - 10.1 MG/DL    Bilirubin, total 0.2 0.2 - 1.0 MG/DL    ALT (SGPT) 20 13 - 56 U/L    AST (SGOT) 11 10 - 38 U/L    Alk.  phosphatase 67 45 - 117 U/L    Protein, total 6.7 6.4 - 8.2 g/dL    Albumin 2.9 (L) 3.4 - 5.0 g/dL    Globulin 3.8 2.0 - 4.0 g/dL    A-G Ratio 0.8 0.8 - 1.7     CBC WITH AUTOMATED DIFF    Collection Time: 08/09/19  3:50 AM   Result Value Ref Range    WBC 14.8 (H) 4.6 - 13.2 K/uL    RBC 3.95 (L) 4.20 - 5.30 M/uL HGB 10.9 (L) 12.0 - 16.0 g/dL    HCT 33.4 (L) 35.0 - 45.0 %    MCV 84.6 74.0 - 97.0 FL    MCH 27.6 24.0 - 34.0 PG    MCHC 32.6 31.0 - 37.0 g/dL    RDW 15.3 (H) 11.6 - 14.5 %    PLATELET 702 672 - 631 K/uL    MPV 9.7 9.2 - 11.8 FL    NEUTROPHILS 74 (H) 40 - 73 %    LYMPHOCYTES 18 (L) 21 - 52 %    MONOCYTES 8 3 - 10 %    EOSINOPHILS 0 0 - 5 %    BASOPHILS 0 0 - 2 %    ABS. NEUTROPHILS 10.9 (H) 1.8 - 8.0 K/UL    ABS. LYMPHOCYTES 2.7 0.9 - 3.6 K/UL    ABS. MONOCYTES 1.2 0.05 - 1.2 K/UL    ABS. EOSINOPHILS 0.0 0.0 - 0.4 K/UL    ABS. BASOPHILS 0.0 0.0 - 0.1 K/UL    DF AUTOMATED           Chemistry Recent Labs     08/09/19  0350 08/08/19  0535 08/07/19  1741   * 184* 121*    143 141   K 4.4 4.3 3.9    109 107   CO2 27 23 27   BUN 14 10 11   CREA 0.58* 0.72 0.61   CA 8.9 8.9 8.5   AGAP 6 11 7   BUCR 24* 14 18   AP 67 83 85   TP 6.7 7.3 7.1   ALB 2.9* 2.9* 3.0*   GLOB 3.8 4.4* 4.1*   AGRAT 0.8 0.7* 0.7*        Lactic Acid No results found for: LAC  No results for input(s): LAC in the last 72 hours. Liver Enzymes Protein, total   Date Value Ref Range Status   08/09/2019 6.7 6.4 - 8.2 g/dL Final     Albumin   Date Value Ref Range Status   08/09/2019 2.9 (L) 3.4 - 5.0 g/dL Final     Globulin   Date Value Ref Range Status   08/09/2019 3.8 2.0 - 4.0 g/dL Final     A-G Ratio   Date Value Ref Range Status   08/09/2019 0.8 0.8 - 1.7   Final     AST (SGOT)   Date Value Ref Range Status   08/09/2019 11 10 - 38 U/L Final     Comment:     PLEASE NOTE NEW REFERENCE RANGE     Alk.  phosphatase   Date Value Ref Range Status   08/09/2019 67 45 - 117 U/L Final     Recent Labs     08/09/19  0350 08/08/19  0535 08/07/19  1741   TP 6.7 7.3 7.1   ALB 2.9* 2.9* 3.0*   GLOB 3.8 4.4* 4.1*   AGRAT 0.8 0.7* 0.7*   SGOT 11 16 16   AP 67 83 85        CBC w/Diff Recent Labs     08/09/19  0350 08/08/19  0535 08/07/19  1741   WBC 14.8* 7.3 9.1   RBC 3.95* 4.02* 4.45   HGB 10.9* 11.2* 11.4*   HCT 33.4* 34.0* 35.7    319 329 GRANS 74*  --  59   LYMPH 18*  --  28   EOS 0  --  6*        Cardiac Enzymes No results found for: CPK, CK, CKMMB, CKMB, RCK3, CKMBT, CKNDX, CKND1, PARESH, TROPT, TROIQ, ED, TROPT, TNIPOC, BNP, BNPP     BNP No results found for: BNP, BNPP, XBNPT     Coagulation No results for input(s): PTP, INR, APTT in the last 72 hours. No lab exists for component: INREXT, INREXT      Thyroid  Lab Results   Component Value Date/Time    TSH 3.64 08/08/2019 05:35 AM       No results found for: T4     Urinalysis Lab Results   Component Value Date/Time    Color YELLOW 08/07/2019 06:56 PM    Appearance CLEAR 08/07/2019 06:56 PM    Specific gravity 1.026 08/07/2019 06:56 PM    pH (UA) 7.0 08/07/2019 06:56 PM    Protein NEGATIVE  08/07/2019 06:56 PM    Glucose NEGATIVE  08/07/2019 06:56 PM    Ketone NEGATIVE  08/07/2019 06:56 PM    Bilirubin NEGATIVE  08/07/2019 06:56 PM    Urobilinogen 1.0 08/07/2019 06:56 PM    Nitrites NEGATIVE  08/07/2019 06:56 PM    Leukocyte Esterase NEGATIVE  08/07/2019 06:56 PM        Micro  Recent Labs     08/08/19  0730   CULT PENDING     Recent Labs     08/08/19  0730   CULT PENDING        ABG No results for input(s): PHI, PHI, POC2, PCO2I, PO2, PO2I, HCO3, HCO3I, FIO2, FIO2I in the last 72 hours. ECHO 8/8/19 · Left Ventricle: Normal cavity size and systolic function (ejection fraction normal). Mild concentric hypertrophy. The estimated ejection fraction is 56 - 60%. There is mild (grade 1) left ventricular diastolic dysfunction E/E' ratio is 6.40. · Left Atrium: Mildly dilated left atrium. · Tricuspid Valve: Normal valve structure and no stenosis. Tricuspid regurgitation is inadequate for estimation of right ventricular systolic pressure. CTA chest 8/7/19:  FINDINGS: This study was repeated due to poor contrast opacification on first  attempt. EXAM QUALITY: Overall exam quality is satisfactory.   Pulmonary arterial  enhancement is adequate with adequate breath hold and no significant artifact. PULMONARY ARTERIES: No evidence of pulmonary embolism. LYMPH Nodes: Multiple enlarged right hilar lymph nodes are seen measuring up to  1.2 cm. There are enlarged AP window lymph nodes measuring 13 mm. Subcentimeter  left hilar lymph nodes are seen. PLEURA: There are no pleural effusions  HEART: Normal in size without pericardial effusion. VASCULATURE/MEDIASTINUM: There is no aortic dissection. LUNGS: There are diffuse reticular nodular interstitial infiltrates with a  miliary-type appearance throughout the right lung and to a lesser degree in the  left lung. Differential diagnoses includes bacterial or viral pneumonia versus  miliary tuberculosis versus thyroid or renal metastasis. Correlate with clinical  picture. AIRWAY: Normal.  UPPER ABDOMEN: There is 11 mm calculus in the upper pole the left kidney. Otherwise visualized solid organs are unremarkable. OTHER: No acute or aggressive osseous abnormalities identified  _______________  IMPRESSION  IMPRESSION:  No evidence of a pulmonary embolism or aortic dissection. Mediastinal and hilar adenopathy. Diffuse reticular nodular interstitial infiltrates throughout both lungs with a  miliary type pattern with differential diagnoses including bacterial or viral  pneumonia versus miliary tuberculosis versus metastatic renal or thyroid  carcinoma. Correlate with clinical picture.         CT abd pelvis 8/8/19:  1. Redemonstration of centrilobular nodularity throughout the right middle and  bilateral lower lobes as described. No evidence of pleural effusion. As  previously, findings are in keeping with an endobronchial dissemination of  inflammation/infection. 2. No suspicious renal lesion identified on this examination. Punctate  nonobstructing lower pole right renal stone. Excreted contrast within the  collecting systems and urinary bladder noted from prior day's contrast enhanced  chest CT.   3. No evidence of adenopathy within the abdomen or pelvis. 4. No focal bowel wall thickening or dilatation. XR (Most Recent). CXR  reviewed by me and compared with previous CXR Results from Hospital Encounter encounter on 08/07/19   XR CHEST PA LAT    Narrative EXAM:  PA and Lateral Chest X-ray     INDICATION: Upper respiratory tract infection about one half weeks ago    COMPARISON: None    ___________    FINDINGS: Heart and mediastinal contours are within normal limits. Lungs are  clear of active disease. There are no pleural effusions. No acute osseous  findings. _____________         Impression IMPRESSION:  No acute process            IMPRESSION:   · Influenza A positive  · B/l extensive diffuse R>L reticular nodular interstitial infiltrates with mediastinal/hilar lymphadenopathy: differentials: infection like viral/atypical bacterial/bacterial/TB/NTB etc, inflammatory like sarcoidosis/HP, malignancy like mets. Doubt other ILD like RB-ILD/DIP. · Mediastinal and hilar lymphadenopathy could be reactive to infection/inflammation, other differential includes lymphoma or malignancy. · Peripheral eosinophilia  · Hx of annual PPD negative, last negative in 10/2018  · Hx of Hashimoto's thyroiditis   · Mild anemia   · Former smoker 0.5 PPD x13 yrs, quit 07/2019  ·   Patient Active Problem List   Diagnosis Code    Dyspnea R06.00    Hashimoto's disease E06.3    Lung infiltrate on CT R91.8    Ex-smoker Z87.891    Morbid obesity with BMI of 45.0-49.9, [facility] E66.01, Z68.42           RECOMMENDATIONS:   WBC increased likely due to steroids, which was discontinued on 8/8/19  No fever. Dry cough +    Sputum gram stain, cx: few GPC, few GPR, rare GNR. Sputum AFB 1st 8/8/19: collected, not reported yet. Check urine Ag panel: negative. Rapid influenza test (?exposed to children at job): Influenza A positive. ESR 67. Check thyroid USG  HIV pending  CRP, HP panel, ACE, QTB plus: pending. Peripheral eosinophilia resolved.      Started on Tamiflu  Since rapid influenza A positive  C/w empiric Rocephin/zithromax  C/w duoneb prn. C/w isolation for now     If AFB negative, then will d/c home on tamiflu and levaquin. F/u with me in 4 weeks as outpatient. Depending on above w/u, and repeat CT as outpatient after Tamiflu treatment, she may need bronchoscopy with BAL for cell count/PCP/cytology/viral cx/AFB/fungal/gram stain/cx and TBBx to look for granulomas/cancer and lung biopsy also for micro. FiO2 to keep SpO2 >=92%, HOB >=30 degree, aspiration precautions, aggressive pulmonary toileting, incentive spirometry, PT/OT eval and treat, mobilization. DVT Prophylaxis: heparin  GI Prophylaxis: protonix  Smoking cessation counseling done by me and spent >3 minutes on it. Other issues management by primary team and respective consultants. Further recommendations will be based on the patient's response to recommended treatment and results of the investigation ordered. Quality Care: PPI, DVT prophylaxis, HOB elevated, infection control all reviewed and addressed. Discussed with patient, radiologic work up showed, answered all questions to their satisfaction. D/w patient.         Jake Hong MD  8/9/2019

## 2019-08-09 NOTE — PROGRESS NOTES
RESPIRATORY NOTE:  Pt seen comfortable on 2 LPM nasal cannula, nebulizer tx given and instructed pt in how to produce good quality sputum specimen, will continue to monitor.

## 2019-08-09 NOTE — PROGRESS NOTES
Bedside  shift change report given to Juana Sarabia  (oncoming nurse) by Coral Perez  (offgoing nurse). Report included the following information SBAR, Kardex, ED Summary, Intake/Output, MAR, Accordion, Recent Results, Med Rec Status and Quality Measures.

## 2019-08-09 NOTE — PROGRESS NOTES
Hospitalist Progress Note-critical care note     Patient: Fernando Canales MRN: [id number]  CSN: [id number]    YOB: 1985  Age: 27 y.o. Sex: female    DOA: 8/7/2019 LOS:  LOS: 2 days            Chief complaint: dyspnea, lung infiltration,  Ex smoker , obesity     Assessment/Plan         Hospital Problems  Date Reviewed: 8/8/2019          Codes Class Noted POA    Hashimoto's disease ICD-10-CM: E06.3  ICD-9-CM: 245.2  8/8/2019 Unknown        Lung infiltrate on CT ICD-10-CM: R91.8  ICD-9-CM: 793.19  8/8/2019 Unknown        Ex-smoker ICD-10-CM: T67.604  ICD-9-CM: V15.82  8/8/2019 Unknown        Morbid obesity with BMI of 45.0-49.9, adult [facility]) ICD-10-CM: E66.01, Z68.42  ICD-9-CM: 278.01, V85.42  8/8/2019 Unknown        * (Principal) Dyspnea ICD-10-CM: R06.00  ICD-9-CM: 786.09  8/7/2019 Yes              Dyspnea   Reported better   Cta no pe, but lung infiltration vs TB and met disease from thyroid and renal   Ct abdomen-no other lesion noticed   Sent (sputum cx x3 w/ AFB) to r/o tb. No risk factors, one was sent   Continue neg pressure isolation till r./p   pulm on board and planning bronch,   Stre pneu negative, legionella neg  Hiv/hypersense pneumonitis   Esr elevated   Echo ef wnl, mild diastolic dysfunction     Flu A  Will continue tamiflu     Lung infiltrate on ct    -rocephin and azithromycin   -consider thyroid U/S pending     Hypothyroidism   tsh is wnl  continue synthroid    Morbid obesity   bmi 46   Need lifestyle modification     Ex-smoker  Will provide nicotine patch as needed      Subjective: sob better     rn : no acute issue     Disposition :tbd,   Review of systems:    General: No fevers or chills. Cardiovascular: No chest pain or pressure. No palpitations. Pulmonary: shortness of breath improving   Gastrointestinal: No nausea, vomiting.      Vital signs/Intake and Output:  Visit Vitals  /74   Pulse 68   Temp 98 °F (36.7 °C)   Resp 16   Ht 5' 5\" (1.651 m)   Wt 125.6 kg (277 lb) SpO2 94%   Breastfeeding? No   BMI 46.10 kg/m²     Current Shift:  No intake/output data recorded. Last three shifts:  08/07 1901 - 08/09 0700  In: 3404 [P.O.:400; I.V.:1186]  Out: 500 [Urine:500]    Physical Exam:  General: WD, WN. Alert, cooperative, no acute distress    HEENT: NC, Atraumatic. PERRLA, anicteric sclerae. Lungs: CTA Bilaterally. No Wheezing, no Rhonchi/Rales. Heart:  Regular  rhythm,  No murmur, No Rubs, No Gallops  Abdomen: Soft, Non distended, Non tender.  +Bowel sounds,   Extremities: No c/c/e  Psych:   Not anxious or agitated. Neurologic:  No acute neurological deficit. Labs: Results:       Chemistry Recent Labs     08/09/19  0350 08/08/19  0535 08/07/19  1741   * 184* 121*    143 141   K 4.4 4.3 3.9    109 107   CO2 27 23 27   BUN 14 10 11   CREA 0.58* 0.72 0.61   CA 8.9 8.9 8.5   AGAP 6 11 7   BUCR 24* 14 18   AP 67 83 85   TP 6.7 7.3 7.1   ALB 2.9* 2.9* 3.0*   GLOB 3.8 4.4* 4.1*   AGRAT 0.8 0.7* 0.7*      CBC w/Diff Recent Labs     08/09/19  0350 08/08/19  0535 08/07/19  1741   WBC 14.8* 7.3 9.1   RBC 3.95* 4.02* 4.45   HGB 10.9* 11.2* 11.4*   HCT 33.4* 34.0* 35.7    319 329   GRANS 74*  --  59   LYMPH 18*  --  28   EOS 0  --  6*      Cardiac Enzymes Recent Labs     08/07/19  1741      CKND1 CALCULATION NOT PERFORMED WHEN RESULT IS BELOW LINEAR LIMIT      Coagulation No results for input(s): PTP, INR, APTT in the last 72 hours. No lab exists for component: INREXT, INREXT    Lipid Panel No results found for: CHOL, CHOLPOCT, CHOLX, CHLST, CHOLV, 169737, HDL, LDL, LDLC, DLDLP, 078842, VLDLC, VLDL, TGLX, TRIGL, TRIGP, TGLPOCT, CHHD, CHHDX   BNP No results for input(s): BNPP in the last 72 hours.    Liver Enzymes Recent Labs     08/09/19  0350   TP 6.7   ALB 2.9*   AP 67   SGOT 11      Thyroid Studies Lab Results   Component Value Date/Time    TSH 3.64 08/08/2019 05:35 AM        Procedures/imaging: see electronic medical records for all procedures/Xrays and details which were not copied into this note but were reviewed prior to creation of Richard Langley MD

## 2019-08-09 NOTE — PROGRESS NOTES
1953 - Head to toe assessment completed at this time. Pt denies any chest pain or any increased occurrences of SOB. Pt encouraged at this time to call for assistance. Pt left in bed with no signs of distress, bed in the low position, wheels locked, and call light within reach. Will continue to monitor. Shift summary - Pt had an uneventful night. Pt left in bed with no signs of distress.

## 2019-08-09 NOTE — PROGRESS NOTES
Currently ruling out TB. Please encourage ambulation as appropriate to assist with transition of care need. No transition of care needs have been identified at this time. CM continuing to follow and asssit. Care Management Interventions  PCP Verified by CM: Yes  Mode of Transport at Discharge: Other (see comment)(family/friend)  Transition of Care Consult (CM Consult): Discharge Planning  Health Maintenance Reviewed: Yes  Current Support Network:  Other, Family Lives Nearby  Confirm Follow Up Transport: Self  Discharge Location  Discharge Placement: Home with family assistance

## 2019-08-09 NOTE — PROGRESS NOTES
Problem: Risk for Spread of Infection  Goal: Prevent transmission of infectious organism to others  Description  Prevent the transmission of infectious organisms to other patients, staff members, and visitors.   Outcome: Progressing Towards Goal     Problem: Patient Education:  Go to Education Activity  Goal: Patient/Family Education  Outcome: Progressing Towards Goal

## 2019-08-09 NOTE — ROUTINE PROCESS
Bedside and Verbal shift change report given to MATTHEW Aragon RN (oncoming nurse) by Uriah Marie RN (offgoing nurse). Report included the following information SBAR, Kardex, Intake/Output, MAR and Recent Results.

## 2019-08-09 NOTE — CDMP QUERY
Pt admitted with dyspnea. Pt noted to have influenza A. If possible, please document in the progress notes and d/c summary if you are evaluating and / or treating any of the following: 
 
? Gram negative pneumonia ? Gram positive pneumonia ? MRSA pneumonia ? Bacterial pneumonia ? Viral pneumonia ? Aspiration pneumonia ? Hypostatic pneumonia 
? Other (please specify) ? Unknown The medical record reflects the following: 
 Risk Factors: flu 
 Clinical Indicators: WBC 14.8, neutrophils -74, Diffuse reticular nodular interstitial infiltrates throughout both lungs with a 
miliary type pattern with differential diagnoses including bacterial or viral 
pneumonia versus miliary tuberculosis versus metastatic renal or thyroid 
carcinoma. Treatment: antibotics Thank-You Lula Hartmann RN [address] [place] [address] [phone]

## 2019-08-10 ENCOUNTER — APPOINTMENT (OUTPATIENT)
Dept: GENERAL RADIOLOGY | Age: 34
DRG: 194 | End: 2019-08-10
Attending: INTERNAL MEDICINE
Payer: COMMERCIAL

## 2019-08-10 LAB
ALBUMIN SERPL-MCNC: 2.9 G/DL (ref 3.4–5)
ALBUMIN/GLOB SERPL: 0.7 {RATIO} (ref 0.8–1.7)
ALP SERPL-CCNC: 70 U/L (ref 45–117)
ALT SERPL-CCNC: 20 U/L (ref 13–56)
ANION GAP SERPL CALC-SCNC: 10 MMOL/L (ref 3–18)
AST SERPL-CCNC: 11 U/L (ref 10–38)
BASOPHILS # BLD: 0 K/UL (ref 0–0.1)
BASOPHILS NFR BLD: 0 % (ref 0–2)
BILIRUB SERPL-MCNC: 0.3 MG/DL (ref 0.2–1)
BUN SERPL-MCNC: 13 MG/DL (ref 7–18)
BUN/CREAT SERPL: 19 (ref 12–20)
CALCIUM SERPL-MCNC: 8.2 MG/DL (ref 8.5–10.1)
CHLORIDE SERPL-SCNC: 104 MMOL/L (ref 100–111)
CO2 SERPL-SCNC: 26 MMOL/L (ref 21–32)
CREAT SERPL-MCNC: 0.67 MG/DL (ref 0.6–1.3)
DIFFERENTIAL METHOD BLD: ABNORMAL
EOSINOPHIL # BLD: 0.4 K/UL (ref 0–0.4)
EOSINOPHIL NFR BLD: 4 % (ref 0–5)
ERYTHROCYTE [DISTWIDTH] IN BLOOD BY AUTOMATED COUNT: 16.1 % (ref 11.6–14.5)
GLOBULIN SER CALC-MCNC: 3.9 G/DL (ref 2–4)
GLUCOSE SERPL-MCNC: 75 MG/DL (ref 74–99)
HCT VFR BLD AUTO: 35 % (ref 35–45)
HGB BLD-MCNC: 11.3 G/DL (ref 12–16)
LYMPHOCYTES # BLD: 3.4 K/UL (ref 0.9–3.6)
LYMPHOCYTES NFR BLD: 33 % (ref 21–52)
MCH RBC QN AUTO: 27.8 PG (ref 24–34)
MCHC RBC AUTO-ENTMCNC: 32.3 G/DL (ref 31–37)
MCV RBC AUTO: 86 FL (ref 74–97)
MONOCYTES # BLD: 0.7 K/UL (ref 0.05–1.2)
MONOCYTES NFR BLD: 7 % (ref 3–10)
NEUTS SEG # BLD: 5.8 K/UL (ref 1.8–8)
NEUTS SEG NFR BLD: 56 % (ref 40–73)
PLATELET # BLD AUTO: 342 K/UL (ref 135–420)
PMV BLD AUTO: 9.7 FL (ref 9.2–11.8)
POTASSIUM SERPL-SCNC: 4.3 MMOL/L (ref 3.5–5.5)
PROT SERPL-MCNC: 6.8 G/DL (ref 6.4–8.2)
RBC # BLD AUTO: 4.07 M/UL (ref 4.2–5.3)
SODIUM SERPL-SCNC: 140 MMOL/L (ref 136–145)
WBC # BLD AUTO: 10.3 K/UL (ref 4.6–13.2)

## 2019-08-10 PROCEDURE — 85025 COMPLETE CBC W/AUTO DIFF WBC: CPT

## 2019-08-10 PROCEDURE — 74011000250 HC RX REV CODE- 250: Performed by: FAMILY MEDICINE

## 2019-08-10 PROCEDURE — 74011250637 HC RX REV CODE- 250/637: Performed by: HOSPITALIST

## 2019-08-10 PROCEDURE — C9113 INJ PANTOPRAZOLE SODIUM, VIA: HCPCS | Performed by: FAMILY MEDICINE

## 2019-08-10 PROCEDURE — 71046 X-RAY EXAM CHEST 2 VIEWS: CPT

## 2019-08-10 PROCEDURE — 74011250636 HC RX REV CODE- 250/636: Performed by: FAMILY MEDICINE

## 2019-08-10 PROCEDURE — 65270000029 HC RM PRIVATE

## 2019-08-10 PROCEDURE — 87070 CULTURE OTHR SPECIMN AEROBIC: CPT

## 2019-08-10 PROCEDURE — 77010033678 HC OXYGEN DAILY

## 2019-08-10 PROCEDURE — 80053 COMPREHEN METABOLIC PANEL: CPT

## 2019-08-10 PROCEDURE — 36415 COLL VENOUS BLD VENIPUNCTURE: CPT

## 2019-08-10 PROCEDURE — 74011250637 HC RX REV CODE- 250/637: Performed by: FAMILY MEDICINE

## 2019-08-10 RX ADMIN — NORGESTIMATE AND ETHINYL ESTRADIOL 1 TABLET: KIT at 08:43

## 2019-08-10 RX ADMIN — HEPARIN SODIUM 5000 UNITS: 5000 INJECTION INTRAVENOUS; SUBCUTANEOUS at 15:11

## 2019-08-10 RX ADMIN — HEPARIN SODIUM 5000 UNITS: 5000 INJECTION INTRAVENOUS; SUBCUTANEOUS at 21:32

## 2019-08-10 RX ADMIN — LEVOTHYROXINE SODIUM 75 MCG: 75 TABLET ORAL at 06:31

## 2019-08-10 RX ADMIN — OSELTAMIVIR PHOSPHATE 75 MG: 75 CAPSULE ORAL at 21:33

## 2019-08-10 RX ADMIN — VENLAFAXINE 37.5 MG: 37.5 TABLET ORAL at 08:42

## 2019-08-10 RX ADMIN — OSELTAMIVIR PHOSPHATE 75 MG: 75 CAPSULE ORAL at 08:41

## 2019-08-10 RX ADMIN — PANTOPRAZOLE SODIUM 40 MG: 40 INJECTION, POWDER, LYOPHILIZED, FOR SOLUTION INTRAVENOUS at 08:41

## 2019-08-10 RX ADMIN — SODIUM CHLORIDE 500 MG: 900 INJECTION, SOLUTION INTRAVENOUS at 03:41

## 2019-08-10 RX ADMIN — CEFTRIAXONE 1 G: 1 INJECTION, POWDER, FOR SOLUTION INTRAMUSCULAR; INTRAVENOUS at 03:40

## 2019-08-10 RX ADMIN — VENLAFAXINE 37.5 MG: 37.5 TABLET ORAL at 15:12

## 2019-08-10 RX ADMIN — HEPARIN SODIUM 5000 UNITS: 5000 INJECTION INTRAVENOUS; SUBCUTANEOUS at 06:31

## 2019-08-10 RX ADMIN — VENLAFAXINE 37.5 MG: 37.5 TABLET ORAL at 21:33

## 2019-08-10 NOTE — PROGRESS NOTES
[facility]                  Pulmonary, Critical Care, and Sleep Medicine     Name: Morgan Moon MRN: [id number]   : 1985 Hospital: [facility]    Date: 8/10/2019        [facility]                                              Consult requesting physician: Dr. Radhika Denton  Reason for Consult: abnormal CT, dyspnea, cough    Subjective/History:     Morgan Moon is a 27 y.o. female with PMHx significant for Hashimoto's thyroiditis, former smoker came to ER with dyspnea, cough and found to have abnormal CT, found to have influenza A positive. HPI 18: Pt works for [employer]. 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 She quit smoking about 1 month ago when she started heaving some respiratory issues. Pt developed HA, body ache and not feeling well, dry cough and mild dyspnea about 10-12 days ago, thought to have some URI sx about, seen by PCP. Since then, c/w mostly dry cough, occ seldom scant white expectoration. Never had hemoptysis. Mild dyspnea without wheezing. Low grade temp with feeling temp max 100. Feeling cold intermittently at night, but no night sweats. No wt loss lymphadenopathy GI sx. Since last couple of days, felt some sinus pain. Former smoker 0.5 PPD x13 yrs, quit 2019  No travel to TB endemic area. No recent travel at all. 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 No birds/pets at home No mold at home. Not living on farm or exposed to farm animals or farm things. Denies IVDA or immunosuppressed stated. Never had HIV or hepatitis. No illicit drug use. Never had any lung disease. No skin rash joint issues.      ILD History:   Hx of connective tissue disease such as Lupus, Scleroderma, RA: none  Hx of Sarcoidosis: none  Hx of meds such as methotrexate, amiodarone, nitrofurantoin: never  Hx of cancer, chemotherapy, radiation: never  Hx of birds, chickens, farm animals: none  Hx of molds, hot tubs: no  Hx of GERD: no  Hx of aspiration: no      8/10/2019  Feeling better   Using incentive spirometry. No fever chills  WBC normalized. Influenza A positive  Sputum cx moderate normal gianluca  AFB still pending  ESR elevated 67  CRP >9.5  Mild dry cough, no sputum production  No dyspnea   Hemodynamics stable  No other issues overnight. Review of Systems:   HEENT: No epistaxis, no nasal drainage, no difficulty in swallowing, no redness in eyes  Respiratory: as above  Cardiovascular: no chest pain, no palpitations, no chronic leg edema, no syncope  Gastrointestinal: no abd pain, no vomiting, no diarrhea, no bleeding symptoms  Genitourinary: No urinary symptoms or hematuria  Integument/breast: No ulcers or rashes  Musculoskeletal:Neg  Neurological: No focal weakness, no seizures, no headaches  Behvioral/Psych: No anxiety, no depression  Constitutional: No fever, no chills, no weight loss, no night sweats       Immunization status:   Immunization History   Administered Date(s) Administered    Tdap 06/28/2019        Past Medical History:   Diagnosis Date    Hashimoto's disease         Past Surgical History:   Procedure Laterality Date    HX KNEE REPLACEMENT          History reviewed. No pertinent family history. Social History     Tobacco Use    Smoking status: Former Smoker    Smokeless tobacco: Never Used   Substance Use Topics    Alcohol use: Not Currently        Prior to Admission medications    Medication Sig Start Date End Date Taking? Authorizing Provider   levothyroxine (SYNTHROID) 75 mcg tablet Take  by mouth Daily (before breakfast). Yes Other, MD Gisell   venlafaxine (EFFEXOR) 37.5 mg tablet Take 37.5 mg by mouth three (3) times daily. Yes Geena, MD Gisell   norgestimate-ethinyl estradiol (ORTHO TRI-CYCLEN, TRI-SPRINTEC) 0.18/0.215/0.25 mg-35 mcg (28) tab Take  by mouth.    Yes Geena, MD Gisell       Current Facility-Administered Medications   Medication Dose Route Frequency    cefTRIAXone (ROCEPHIN) 1 g in sterile water (preservative free) 10 mL IV syringe  1 g IntraVENous Q24H    azithromycin (ZITHROMAX) 500 mg in 0.9% sodium chloride 250 mL IVPB  500 mg IntraVENous Q24H    pantoprazole (PROTONIX) 40 mg in sodium chloride 0.9% 10 mL injection  40 mg IntraVENous DAILY    norgestimate-ethinyl estradiol (ORTHO-CYCLEN, SPRINTEC) 0.25-35 mg-mcg per tablet 1 Tab (Patient Supplied)  1 Tab Oral DAILY    oseltamivir (TAMIFLU) capsule 75 mg  75 mg Oral BID    heparin (porcine) injection 5,000 Units  5,000 Units SubCUTAneous Q8H    albuterol-ipratropium (DUO-NEB) 2.5 MG-0.5 MG/3 ML  3 mL Nebulization Q4H PRN    levothyroxine (SYNTHROID) tablet 75 mcg  75 mcg Oral ACB    venlafaxine (EFFEXOR) tablet 37.5 mg  37.5 mg Oral TID         Objective:   Vital Signs:    Visit Vitals  /88 (BP 1 Location: Left arm, BP Patient Position: Supine)   Pulse 71   Temp 98.1 °F (36.7 °C)   Resp 16   Ht 5' 5\" (1.651 m)   Wt 125.6 kg (277 lb)   LMP 2019   SpO2 91%   Breastfeeding? No   BMI 46.10 kg/m²       O2 Device: Nasal cannula   O2 Flow Rate (L/min): 2 l/min   Temp (24hrs), Av.2 °F (36.8 °C), Min:97.8 °F (36.6 °C), Max:98.9 °F (37.2 °C)       Intake/Output:   Last shift:      No intake/output data recorded. Last 3 shifts:  1901 - 08/10 0700  In: 980 [P.O.:720; I.V.:260]  Out: 1000 [Urine:1000]    Intake/Output Summary (Last 24 hours) at 8/10/2019 0949  Last data filed at 8/10/2019 0341  Gross per 24 hour   Intake 740 ml   Output 600 ml   Net 140 ml       Physical Exam:     General/Neurology: Alert, Awake, NAD. Head:   Normocephalic, without obvious abnormality, atraumatic. Eye:   EOM intact, no scleral icterus, no pallor, no cyanosis. Nose:   No sinus tenderness, no erythema, no induration, no discharge  Throat:  Lips, mucosa, and tongue normal. No oral thrush. Neck:   Supple, symmetric. No carotid bruit.  No lymphadenopathy. Trachea midline  Lung:   Good air entry bilateral equal. No wheezing. No stridors. No prolongded expiration. No accessory muscle use. No rales or rhonchi. Heart:   Regular rate & rhythm. S1 S2 present. No murmur. No JVD. Abdomen:  Soft. NT. ND. Obese. Extremities:  No pedal edema. No cyanosis. No clubbing. Pulses: 2+ and symmetric in DP. Capillary refill: normal.   Lymphatic:  No cervical or supraclavicular palpable lymphadenopathy. Musculoskeletal: No joint swelling. No tenderness. Skin:   Color, texture, turgor normal. No rashes or lesions. Data:       Recent Results (from the past 24 hour(s))   METABOLIC PANEL, COMPREHENSIVE    Collection Time: 08/10/19  3:32 AM   Result Value Ref Range    Sodium 140 136 - 145 mmol/L    Potassium 4.3 3.5 - 5.5 mmol/L    Chloride 104 100 - 111 mmol/L    CO2 26 21 - 32 mmol/L    Anion gap 10 3.0 - 18 mmol/L    Glucose 75 74 - 99 mg/dL    BUN 13 7.0 - 18 MG/DL    Creatinine 0.67 0.6 - 1.3 MG/DL    BUN/Creatinine ratio 19 12 - 20      GFR est AA >60 >60 ml/min/1.73m2    GFR est non-AA >60 >60 ml/min/1.73m2    Calcium 8.2 (L) 8.5 - 10.1 MG/DL    Bilirubin, total 0.3 0.2 - 1.0 MG/DL    ALT (SGPT) 20 13 - 56 U/L    AST (SGOT) 11 10 - 38 U/L    Alk. phosphatase 70 45 - 117 U/L    Protein, total 6.8 6.4 - 8.2 g/dL    Albumin 2.9 (L) 3.4 - 5.0 g/dL    Globulin 3.9 2.0 - 4.0 g/dL    A-G Ratio 0.7 (L) 0.8 - 1.7     CBC WITH AUTOMATED DIFF    Collection Time: 08/10/19  3:32 AM   Result Value Ref Range    WBC 10.3 4.6 - 13.2 K/uL    RBC 4.07 (L) 4.20 - 5.30 M/uL    HGB 11.3 (L) 12.0 - 16.0 g/dL    HCT 35.0 35.0 - 45.0 %    MCV 86.0 74.0 - 97.0 FL    MCH 27.8 24.0 - 34.0 PG    MCHC 32.3 31.0 - 37.0 g/dL    RDW 16.1 (H) 11.6 - 14.5 %    PLATELET 438 529 - 598 K/uL    MPV 9.7 9.2 - 11.8 FL    NEUTROPHILS 56 40 - 73 %    LYMPHOCYTES 33 21 - 52 %    MONOCYTES 7 3 - 10 %    EOSINOPHILS 4 0 - 5 %    BASOPHILS 0 0 - 2 %    ABS. NEUTROPHILS 5.8 1.8 - 8.0 K/UL    ABS. LYMPHOCYTES 3.4 0.9 - 3.6 K/UL    ABS. MONOCYTES 0.7 0.05 - 1.2 K/UL    ABS. EOSINOPHILS 0.4 0.0 - 0.4 K/UL    ABS. BASOPHILS 0.0 0.0 - 0.1 K/UL    DF AUTOMATED           Chemistry Recent Labs     08/10/19  0332 08/09/19  0350 08/08/19  0535   GLU 75 107* 184*    142 143   K 4.3 4.4 4.3    109 109   CO2 26 27 23   BUN 13 14 10   CREA 0.67 0.58* 0.72   CA 8.2* 8.9 8.9   AGAP 10 6 11   BUCR 19 24* 14   AP 70 67 83   TP 6.8 6.7 7.3   ALB 2.9* 2.9* 2.9*   GLOB 3.9 3.8 4.4*   AGRAT 0.7* 0.8 0.7*        Lactic Acid No results found for: LAC  No results for input(s): LAC in the last 72 hours. Liver Enzymes Protein, total   Date Value Ref Range Status   08/10/2019 6.8 6.4 - 8.2 g/dL Final     Albumin   Date Value Ref Range Status   08/10/2019 2.9 (L) 3.4 - 5.0 g/dL Final     Globulin   Date Value Ref Range Status   08/10/2019 3.9 2.0 - 4.0 g/dL Final     A-G Ratio   Date Value Ref Range Status   08/10/2019 0.7 (L) 0.8 - 1.7   Final     AST (SGOT)   Date Value Ref Range Status   08/10/2019 11 10 - 38 U/L Final     Comment:     PLEASE NOTE NEW REFERENCE RANGE     Alk. phosphatase   Date Value Ref Range Status   08/10/2019 70 45 - 117 U/L Final     Recent Labs     08/10/19  0332 08/09/19  0350 08/08/19  0535   TP 6.8 6.7 7.3   ALB 2.9* 2.9* 2.9*   GLOB 3.9 3.8 4.4*   AGRAT 0.7* 0.8 0.7*   SGOT 11 11 16   AP 70 67 83        CBC w/Diff Recent Labs     08/10/19  0332 08/09/19  0350 08/08/19  0535 08/07/19  1741   WBC 10.3 14.8* 7.3 9.1   RBC 4.07* 3.95* 4.02* 4.45   HGB 11.3* 10.9* 11.2* 11.4*   HCT 35.0 33.4* 34.0* 35.7    348 319 329   GRANS 56 74*  --  59   LYMPH 33 18*  --  28   EOS 4 0  --  6*        Cardiac Enzymes No results found for: CPK, CK, CKMMB, CKMB, RCK3, CKMBT, CKNDX, CKND1, PARESH, TROPT, TROIQ, DE, TROPT, TNIPOC, BNP, BNPP     BNP No results found for: BNP, BNPP, XBNPT     Coagulation No results for input(s): PTP, INR, APTT in the last 72 hours.     No lab exists for component: INREXT, INREXT      Thyroid  Lab Results   Component Value Date/Time    TSH 3.64 08/08/2019 05:35 AM       No results found for: T4     Urinalysis Lab Results   Component Value Date/Time    Color YELLOW 08/07/2019 06:56 PM    Appearance CLEAR 08/07/2019 06:56 PM    Specific gravity 1.026 08/07/2019 06:56 PM    pH (UA) 7.0 08/07/2019 06:56 PM    Protein NEGATIVE  08/07/2019 06:56 PM    Glucose NEGATIVE  08/07/2019 06:56 PM    Ketone NEGATIVE  08/07/2019 06:56 PM    Bilirubin NEGATIVE  08/07/2019 06:56 PM    Urobilinogen 1.0 08/07/2019 06:56 PM    Nitrites NEGATIVE  08/07/2019 06:56 PM    Leukocyte Esterase NEGATIVE  08/07/2019 06:56 PM        Micro  Recent Labs     08/08/19  0730   CULT FEW GRAM NEGATIVE RODS*  MODERATE NORMAL RESPIRATORY KAUR     Recent Labs     08/08/19  0730   CULT FEW GRAM NEGATIVE RODS*  MODERATE NORMAL RESPIRATORY KAUR        ABG No results for input(s): PHI, PHI, POC2, PCO2I, PO2, PO2I, HCO3, HCO3I, FIO2, FIO2I in the last 72 hours. ECHO 8/8/19 · Left Ventricle: Normal cavity size and systolic function (ejection fraction normal). Mild concentric hypertrophy. The estimated ejection fraction is 56 - 60%. There is mild (grade 1) left ventricular diastolic dysfunction E/E' ratio is 6.40. · Left Atrium: Mildly dilated left atrium. · Tricuspid Valve: Normal valve structure and no stenosis. Tricuspid regurgitation is inadequate for estimation of right ventricular systolic pressure. CTA chest 8/7/19:  FINDINGS: This study was repeated due to poor contrast opacification on first  attempt. EXAM QUALITY: Overall exam quality is satisfactory. Pulmonary arterial  enhancement is adequate with adequate breath hold and no significant artifact. PULMONARY ARTERIES: No evidence of pulmonary embolism. LYMPH Nodes: Multiple enlarged right hilar lymph nodes are seen measuring up to  1.2 cm. There are enlarged AP window lymph nodes measuring 13 mm.  Subcentimeter  left hilar lymph nodes are seen.  PLEURA: There are no pleural effusions  HEART: Normal in size without pericardial effusion. VASCULATURE/MEDIASTINUM: There is no aortic dissection. LUNGS: There are diffuse reticular nodular interstitial infiltrates with a  miliary-type appearance throughout the right lung and to a lesser degree in the  left lung. Differential diagnoses includes bacterial or viral pneumonia versus  miliary tuberculosis versus thyroid or renal metastasis. Correlate with clinical  picture. AIRWAY: Normal.  UPPER ABDOMEN: There is 11 mm calculus in the upper pole the left kidney. Otherwise visualized solid organs are unremarkable. OTHER: No acute or aggressive osseous abnormalities identified  _______________  IMPRESSION  IMPRESSION:  No evidence of a pulmonary embolism or aortic dissection. Mediastinal and hilar adenopathy. Diffuse reticular nodular interstitial infiltrates throughout both lungs with a  miliary type pattern with differential diagnoses including bacterial or viral  pneumonia versus miliary tuberculosis versus metastatic renal or thyroid  carcinoma. Correlate with clinical picture.         CT abd pelvis 8/8/19:  1. Redemonstration of centrilobular nodularity throughout the right middle and  bilateral lower lobes as described. No evidence of pleural effusion. As  previously, findings are in keeping with an endobronchial dissemination of  inflammation/infection. 2. No suspicious renal lesion identified on this examination. Punctate  nonobstructing lower pole right renal stone. Excreted contrast within the  collecting systems and urinary bladder noted from prior day's contrast enhanced  chest CT. 3. No evidence of adenopathy within the abdomen or pelvis. 4. No focal bowel wall thickening or dilatation. USG thyroid 8/9/19:  Findings are consistent with patient's history of Hashimoto's thyroiditis. No discrete nodules. XR (Most Recent).  CXR  reviewed by me and compared with previous CXR Results from Hospital Encounter encounter on 08/07/19   XR CHEST PA LAT    Narrative EXAM:  PA and Lateral Chest X-ray     INDICATION: Upper respiratory tract infection about one half weeks ago    COMPARISON: None    ___________    FINDINGS: Heart and mediastinal contours are within normal limits. Lungs are  clear of active disease. There are no pleural effusions. No acute osseous  findings. _____________         Impression IMPRESSION:  No acute process            IMPRESSION:   · Influenza A positive  · B/l extensive diffuse R>L reticular nodular interstitial infiltrates with mediastinal/hilar lymphadenopathy: differentials: infection like viral/atypical bacterial/bacterial/TB/NTB etc, inflammatory like sarcoidosis/HP, malignancy like mets. Doubt other ILD like RB-ILD/DIP. · Mediastinal and hilar lymphadenopathy could be reactive to infection/inflammation, other differential includes lymphoma or malignancy. · Peripheral eosinophilia  · Hx of annual PPD negative, last negative in 10/2018  · Hx of Hashimoto's thyroiditis   · Mild anemia   · Former smoker 0.5 PPD x13 yrs, quit 07/2019  ·   Patient Active Problem List   Diagnosis Code    Dyspnea R06.00    Hashimoto's disease E06.3    Lung infiltrate on CT R91.8    Ex-smoker Z87.891    Morbid obesity with BMI of 45.0-49.9, adult ([facility] E66.01, Z68.42    Influenza A J10.1           RECOMMENDATIONS:   WBC increased likely due to steroids, which was discontinued on 8/8/19  No fever. Dry cough +    Sputum gram stain, cx: moderate normal gianluca. Sputum AFB 1st 8/8/19: collected, not reported yet. I spoke by our lab, who will call [facility] to get the result and will let us know. Urine Ag panel: negative. Rapid influenza test (?exposed to children at job): Influenza A positive. ESR 67. CRP >9.5    Thyroid USG c/w Hashimoto's thyroiditis  HIV negative. ACE normal 46  HP panel, QTB plus: pending. Peripheral eosinophilia resolved.      Started on Tamiflu Since rapid influenza A positive, complete the course. C/w empiric Rocephin/zithromax  C/w duoneb prn. C/w isolation for now     Repeat CXR ordered. If AFB negative, then will d/c home on tamiflu and levaquin. F/u with me in 4 weeks as outpatient. Depending on above w/u, and repeat CT as outpatient after Tamiflu treatment, she may need bronchoscopy with BAL for cell count/PCP/cytology/viral cx/AFB/fungal/gram stain/cx and TBBx to look for granulomas/cancer and lung biopsy also for micro. FiO2 to keep SpO2 >=92%, HOB >=30 degree, aspiration precautions, aggressive pulmonary toileting, incentive spirometry, PT/OT eval and treat, mobilization. DVT Prophylaxis: heparin  GI Prophylaxis: protonix  Smoking cessation counseling done by me and spent >3 minutes on it. Other issues management by primary team and respective consultants. Further recommendations will be based on the patient's response to recommended treatment and results of the investigation ordered. Quality Care: PPI, DVT prophylaxis, HOB elevated, infection control all reviewed and addressed. Discussed with patient, radiologic work up showed, answered all questions to their satisfaction. D/w patient, Dr. Mike Johnson.         Andrez Cano MD  8/10/2019

## 2019-08-10 NOTE — PROGRESS NOTES
Hospitalist Progress Note    Patient: Muna Fuller MRN: [id number]  CSN: [id number]    YOB: 1985  Age: 27 y.o. Sex: female    DOA: 8/7/2019 LOS:  LOS: 3 days            Assessment/Plan     Principal Problem:    Dyspnea (8/7/2019)    Active Problems:    Hashimoto's disease (8/8/2019)      Lung infiltrate on CT (8/8/2019)      Ex-smoker (8/8/2019)      Morbid obesity with BMI of 45.0-49.9, adult ([address] (8/8/2019)      Influenza A (8/9/2019)    Dyspnea : Improving. Taper off O2. Discussed the case with Dr. Bryan Sol. CTA: no pe, but lung infiltration vs TB and met disease from thyroid and renal   Ct abdomen-no other lesion noticed   Sent (sputum cx x3 w/ AFB) to r/o tb. No risk factors, one was sent   Continue neg pressure isolation till r./p   Stre pneu negative, legionella neg  Hiv/hypersense pneumonitis   Esr elevated   Echo ef wnl, mild diastolic dysfunction      Flu A: continue tamiflu      Lung infiltrate on CT: On rocephin and azithromycin       Hypothyroidism: Ultrasound revealed with diffusely enlarged 2nd to hashimoto's   TSH is wnl  continue synthroid     Morbid obesity with BMI 46: consult for weight control. Need lifestyle modification      Ex-smoker: On nicotine patch as needed     Incentive spirometry    Dispo: TBD. Pending AFB results. CC:  Muna Fuller is a 27 y.o. female with PMHx significant for Hashimoto's thyroiditis, former smoker came to ER with dyspnea, cough and found to have abnormal CT, found to have influenza A positive. Subjective:     Pt was seen and examined with the nurse in the morning round. Less SOB. + Cough with minimal sputum. No acute event overnight. Review of systems  General: No fevers or chills. Cardiovascular: No chest pain or pressure. No palpitations. Pulmonary: + cough, SOB  Gastrointestinal: No nausea, vomiting.      Objective:      Visit Vitals  /75 (BP 1 Location: Left arm, BP Patient Position: At rest)   Pulse 69 Temp 97.8 °F (36.6 °C)   Resp 16   Ht 5' 5\" (1.651 m)   Wt 125.6 kg (277 lb)   SpO2 95%   Breastfeeding? No   BMI 46.10 kg/m²       Physical Exam:    Gen: NAD, non-toxic. Heent:  MMM, NC, AT. Cor: s1s2 RRR. No MRG. PMI mid 5th intercostal space. Resp:  CTA b/l. No w/r/r. Nml effort and diaphragmatic excursion. Abd:  NT ND.  BS positive. No rebound or guarding. No masses. Ext: No edema or cyanosis. Intake and Output:  Current Shift:  No intake/output data recorded. Last three shifts:  08/08 1901 - 08/10 0700  In: 980 [P.O.:720; I.V.:260]  Out: 1000 [Urine:1000]    Labs: Results:       Chemistry Recent Labs     08/10/19  0332 08/09/19  0350 08/08/19  0535   GLU 75 107* 184*    142 143   K 4.3 4.4 4.3    109 109   CO2 26 27 23   BUN 13 14 10   CREA 0.67 0.58* 0.72   CA 8.2* 8.9 8.9   AGAP 10 6 11   BUCR 19 24* 14   AP 70 67 83   TP 6.8 6.7 7.3   ALB 2.9* 2.9* 2.9*   GLOB 3.9 3.8 4.4*   AGRAT 0.7* 0.8 0.7*      CBC w/Diff Recent Labs     08/10/19  0332 08/09/19  0350 08/08/19  0535 08/07/19  1741   WBC 10.3 14.8* 7.3 9.1   RBC 4.07* 3.95* 4.02* 4.45   HGB 11.3* 10.9* 11.2* 11.4*   HCT 35.0 33.4* 34.0* 35.7    348 319 329   GRANS 56 74*  --  59   LYMPH 33 18*  --  28   EOS 4 0  --  6*      Cardiac Enzymes Recent Labs     08/07/19  1741      CKND1 CALCULATION NOT PERFORMED WHEN RESULT IS BELOW LINEAR LIMIT      Coagulation No results for input(s): PTP, INR, APTT in the last 72 hours. No lab exists for component: INREXT    Lipid Panel No results found for: CHOL, CHOLPOCT, CHOLX, CHLST, CHOLV, 805588, HDL, LDL, LDLC, DLDLP, 681256, VLDLC, VLDL, TGLX, TRIGL, TRIGP, TGLPOCT, CHHD, CHHDX   BNP No results for input(s): BNPP in the last 72 hours.    Liver Enzymes Recent Labs     08/10/19  0332   TP 6.8   ALB 2.9*   AP 70   SGOT 11      Thyroid Studies Lab Results   Component Value Date/Time    TSH 3.64 08/08/2019 05:35 AM        Procedures/imaging: see electronic medical records for all procedures/Xrays and details which were not copied into this note but were reviewed prior to creation of Plan      Medications Reviewed  Lottie Drake MD

## 2019-08-10 NOTE — PROGRESS NOTES
0710-received report from Yayo Shen included SBAR MAR and Kardex. Shift summary-no change in pt status. Pt O2 sats drop to 92-93 when on room air but rise to 98 when encouraged to take deep breaths. Encouraged continued use of IS. Bedside and Verbal shift change report given to Yayo Shen (oncoming nurse) by Duarte Torres RN (offgoing nurse). Report included the following information SBAR, Kardex and MAR.

## 2019-08-11 VITALS
HEART RATE: 82 BPM | WEIGHT: 278.4 LBS | TEMPERATURE: 98.3 F | DIASTOLIC BLOOD PRESSURE: 83 MMHG | BODY MASS INDEX: 46.38 KG/M2 | RESPIRATION RATE: 16 BRPM | HEIGHT: 65 IN | OXYGEN SATURATION: 92 % | SYSTOLIC BLOOD PRESSURE: 128 MMHG

## 2019-08-11 PROBLEM — J18.9 ATYPICAL PNEUMONIA: Status: ACTIVE | Noted: 2019-08-11

## 2019-08-11 PROBLEM — J12.9 VIRAL PNEUMONIA: Status: ACTIVE | Noted: 2019-08-11

## 2019-08-11 LAB
BACTERIA SPEC CULT: ABNORMAL
BACTERIA SPEC CULT: ABNORMAL
BASOPHILS # BLD: 0 K/UL (ref 0–0.1)
BASOPHILS NFR BLD: 0 % (ref 0–2)
DIFFERENTIAL METHOD BLD: ABNORMAL
EOSINOPHIL # BLD: 0.4 K/UL (ref 0–0.4)
EOSINOPHIL NFR BLD: 4 % (ref 0–5)
ERYTHROCYTE [DISTWIDTH] IN BLOOD BY AUTOMATED COUNT: 15.1 % (ref 11.6–14.5)
GRAM STN SPEC: ABNORMAL
HCT VFR BLD AUTO: 36.4 % (ref 35–45)
HGB BLD-MCNC: 11.4 G/DL (ref 12–16)
LYMPHOCYTES # BLD: 2.6 K/UL (ref 0.9–3.6)
LYMPHOCYTES NFR BLD: 25 % (ref 21–52)
MCH RBC QN AUTO: 26.1 PG (ref 24–34)
MCHC RBC AUTO-ENTMCNC: 31.3 G/DL (ref 31–37)
MCV RBC AUTO: 83.5 FL (ref 74–97)
MONOCYTES # BLD: 0.8 K/UL (ref 0.05–1.2)
MONOCYTES NFR BLD: 8 % (ref 3–10)
NEUTS SEG # BLD: 6.3 K/UL (ref 1.8–8)
NEUTS SEG NFR BLD: 63 % (ref 40–73)
PLATELET # BLD AUTO: 342 K/UL (ref 135–420)
PMV BLD AUTO: 9.5 FL (ref 9.2–11.8)
RBC # BLD AUTO: 4.36 M/UL (ref 4.2–5.3)
SERVICE CMNT-IMP: ABNORMAL
WBC # BLD AUTO: 10.1 K/UL (ref 4.6–13.2)

## 2019-08-11 PROCEDURE — 74011250637 HC RX REV CODE- 250/637: Performed by: FAMILY MEDICINE

## 2019-08-11 PROCEDURE — 85025 COMPLETE CBC W/AUTO DIFF WBC: CPT

## 2019-08-11 PROCEDURE — 77030027138 HC INCENT SPIROMETER -A

## 2019-08-11 PROCEDURE — 36415 COLL VENOUS BLD VENIPUNCTURE: CPT

## 2019-08-11 PROCEDURE — 74011250637 HC RX REV CODE- 250/637: Performed by: HOSPITALIST

## 2019-08-11 PROCEDURE — 77010033678 HC OXYGEN DAILY

## 2019-08-11 PROCEDURE — 74011000250 HC RX REV CODE- 250: Performed by: FAMILY MEDICINE

## 2019-08-11 PROCEDURE — 74011250636 HC RX REV CODE- 250/636: Performed by: FAMILY MEDICINE

## 2019-08-11 PROCEDURE — C9113 INJ PANTOPRAZOLE SODIUM, VIA: HCPCS | Performed by: FAMILY MEDICINE

## 2019-08-11 RX ORDER — OSELTAMIVIR PHOSPHATE 75 MG/1
75 CAPSULE ORAL 2 TIMES DAILY
Qty: 4 CAP | Refills: 0 | Status: SHIPPED | OUTPATIENT
Start: 2019-08-11 | End: 2019-08-13

## 2019-08-11 RX ORDER — LEVOFLOXACIN 500 MG/1
500 TABLET, FILM COATED ORAL DAILY
Qty: 5 TAB | Refills: 0 | Status: SHIPPED | OUTPATIENT
Start: 2019-08-11 | End: 2019-08-16

## 2019-08-11 RX ADMIN — CEFTRIAXONE 1 G: 1 INJECTION, POWDER, FOR SOLUTION INTRAMUSCULAR; INTRAVENOUS at 03:03

## 2019-08-11 RX ADMIN — OSELTAMIVIR PHOSPHATE 75 MG: 75 CAPSULE ORAL at 08:50

## 2019-08-11 RX ADMIN — SODIUM CHLORIDE 500 MG: 900 INJECTION, SOLUTION INTRAVENOUS at 03:03

## 2019-08-11 RX ADMIN — PANTOPRAZOLE SODIUM 40 MG: 40 INJECTION, POWDER, LYOPHILIZED, FOR SOLUTION INTRAVENOUS at 08:50

## 2019-08-11 RX ADMIN — HEPARIN SODIUM 5000 UNITS: 5000 INJECTION INTRAVENOUS; SUBCUTANEOUS at 06:27

## 2019-08-11 RX ADMIN — VENLAFAXINE 37.5 MG: 37.5 TABLET ORAL at 08:50

## 2019-08-11 RX ADMIN — LEVOTHYROXINE SODIUM 75 MCG: 75 TABLET ORAL at 06:29

## 2019-08-11 RX ADMIN — NORGESTIMATE AND ETHINYL ESTRADIOL 1 TABLET: KIT at 09:00

## 2019-08-11 NOTE — PROGRESS NOTES
6181- Received shift report from [facility]. Patient in bed watching TV. Patient with 1L nasal cannula. 200- Dr. Lauryn Birmingham in to see patient and stated patient has been clear from TB precautions per lab results. Patient to be discharged today. Dr. Lauryn Birmingham stated to do walk test for O2 saturations. If saturations remain above 90 patient will be fine to be discharged. 0860- 6 minute walk test completed. Patient's sats 91%-92% throughout walk with O2 sats at 92% at end of walk. Dr. Lauryn Birmingham aware of walk test.     0115- Discharge paperwork reviewed with patient. IV removed prior to discharge.      Patient Vitals for the past 12 hrs:   Temp Pulse Resp BP SpO2   08/11/19 0902     92 %   08/11/19 0840     93 %   08/11/19 0725 98.3 °F (36.8 °C) 82 16 128/83 93 %   08/11/19 0345 98.4 °F (36.9 °C) 85 16 134/81 91 %   08/10/19 2321 98.4 °F (36.9 °C) 85 16 133/84 96 %   08/10/19 2146     92 %

## 2019-08-11 NOTE — DISCHARGE INSTRUCTIONS

## 2019-08-11 NOTE — DISCHARGE SUMMARY
Discharge Summary    Patient: Stephon Fregoso MRN: [id number]  CSN: [id number]    YOB: 1985  Age: 27 y.o. Sex: female    DOA: 8/7/2019 LOS:  LOS: 4 days   Discharge Date: 8/11/2019     Primary Care Provider:  Dorene Terrell MD    Admission Diagnoses: Dyspnea [R06.00]    Discharge Diagnoses:    Problem List as of 8/11/2019 Date Reviewed: 8/8/2019          Codes Class Noted - Resolved    Viral pneumonia ICD-10-CM: J12.9  ICD-9-CM: 480.9  8/11/2019 - Present        Atypical pneumonia ICD-10-CM: J18.9  ICD-9-CM: 486  8/11/2019 - Present        Influenza A ICD-10-CM: J10.1  ICD-9-CM: 487.1  8/9/2019 - Present        Hashimoto's disease ICD-10-CM: E06.3  ICD-9-CM: 245.2  8/8/2019 - Present        Lung infiltrate on CT ICD-10-CM: R91.8  ICD-9-CM: 793.19  8/8/2019 - Present        Ex-smoker ICD-10-CM: V80.420  ICD-9-CM: V15.82  8/8/2019 - Present        Morbid obesity with BMI of 45.0-49.9, adult [facility]) ICD-10-CM: E66.01, Z68.42  ICD-9-CM: 278.01, V85.42  8/8/2019 - Present        * (Principal) Dyspnea ICD-10-CM: R06.00  ICD-9-CM: 786.09  8/7/2019 - Present              Discharge Medications:     Discharge Medication List as of 8/11/2019  9:34 AM      START taking these medications    Details   oseltamivir (TAMIFLU) 75 mg capsule Take 1 Cap by mouth two (2) times a day for 2 days. Indications: The Flu, Normal, Disp-4 Cap, R-0      levoFLOXacin (LEVAQUIN) 500 mg tablet Take 1 Tab by mouth daily for 5 days. , Normal, Disp-5 Tab, R-0         CONTINUE these medications which have NOT CHANGED    Details   levothyroxine (SYNTHROID) 75 mcg tablet Take  by mouth Daily (before breakfast). , Historical Med      venlafaxine (EFFEXOR) 37.5 mg tablet Take 37.5 mg by mouth three (3) times daily. , Historical Med      norgestimate-ethinyl estradiol (ORTHO TRI-CYCLEN, TRI-SPRINTEC) 0.18/0.215/0.25 mg-35 mcg (28) tab Take  by mouth., Historical Med             Discharge Condition: Stable    Procedures : CT    Consults: Pulmonary: Dr. Anai Davis  /83 (BP 1 Location: Left arm, BP Patient Position: Supine)   Pulse 82   Temp 98.3 °F (36.8 °C)   Resp 16   Ht 5' 5\" (1.651 m)   Wt 126.3 kg (278 lb 6.4 oz)   SpO2 92%   Breastfeeding? No   BMI 46.33 kg/m²     General: Awake, cooperative, no acute distress    HEENT: NC, Atraumatic. PERRLA, EOMI. Anicteric sclerae. Lungs:  CTA Bilaterally. No Wheezing/Rhonchi/Rales. Heart:  Regular  rhythm,  No murmur, No Rubs, No Gallops  Abdomen: Soft, Non distended, Non tender. +Bowel sounds,   Extremities: No c/c/e  Psych:   Not anxious or agitated. Neurologic:  No acute neurological deficits. Admission HPI :     Hospital Course :   Dyspnea : Improving. Taper off O2. Discussed the case with Dr. Scott Boykin. 6 min ambulating test done. Sat O2 >90%  CTA: no pe, but lung infiltration vs TB and met disease from thyroid and renal. She will follow up with Dr. Scott Boykin,   Ct abdomen-no other lesion noticed   Sent (sputum cx x3 w/ AFB) to r/o tb. No risk factors, one was negative. Continue neg pressure isolation till r./p   Stre pneu negative, legionella neg  Hiv/hypersense pneumonitis neg  Esr elevated   Echo ef wnl, mild diastolic dysfunction      Flu A PNA: continue tamiflu to complete the total course of 5 days.     Atypical PNA/Lung infiltrate on CT: On rocephin and azithromycin since her admission. Seen and f/u by Dr. Benny Contreras, will switch to Levaquin 500 mg PO for 5 days.        Hypothyroidism: Ultrasound revealed with diffusely enlarged 2nd to hashimoto's   TSH is wnl  continue synthroid     Morbid obesity with BMI 46: consult for weight control. Need lifestyle modification      Ex-smoker:  On nicotine patch as needed      Incentive spirometry    Activity: Activity as tolerated    Diet: Low fat, Low cholesterol    Follow-up: with PCP within 1 wk and Pulmonary/Dr. Scott Boykin within 3-4 wks    Disposition: Home    Minutes spent on discharge: 41 min      Labs: Results:       Chemistry Recent Labs     08/10/19  0332 08/09/19  0350   GLU 75 107*    142   K 4.3 4.4    109   CO2 26 27   BUN 13 14   CREA 0.67 0.58*   CA 8.2* 8.9   AGAP 10 6   BUCR 19 24*   AP 70 67   TP 6.8 6.7   ALB 2.9* 2.9*   GLOB 3.9 3.8   AGRAT 0.7* 0.8      CBC w/Diff Recent Labs     08/11/19  0419 08/10/19  0332 08/09/19  0350   WBC 10.1 10.3 14.8*   RBC 4.36 4.07* 3.95*   HGB 11.4* 11.3* 10.9*   HCT 36.4 35.0 33.4*    342 348   GRANS 63 56 74*   LYMPH 25 33 18*   EOS 4 4 0      Cardiac Enzymes No results for input(s): CPK, CKND1, PARESH in the last 72 hours. No lab exists for component: CKRMB, TROIP   Coagulation No results for input(s): PTP, INR, APTT in the last 72 hours. No lab exists for component: INREXT, INREXT    Lipid Panel No results found for: CHOL, CHOLPOCT, CHOLX, CHLST, CHOLV, 460620, HDL, LDL, LDLC, DLDLP, 593350, VLDLC, VLDL, TGLX, TRIGL, TRIGP, TGLPOCT, CHHD, CHHDX   BNP No results for input(s): BNPP in the last 72 hours. Liver Enzymes Recent Labs     08/10/19  0332   TP 6.8   ALB 2.9*   AP 70   SGOT 11      Thyroid Studies Lab Results   Component Value Date/Time    TSH 3.64 08/08/2019 05:35 AM            Significant Diagnostic Studies: Xr Chest Pa Lat    Result Date: 8/10/2019  EXAM: Chest radiographs  CLINICAL INDICATION/HISTORY: Pneumonia, dyspnea   > Additional: None. COMPARISON: 08/07/2019 TECHNIQUE: PA and lateral views of the chest _______________ FINDINGS: HEART AND MEDIASTINUM: Cardiac silhouette is normal in size. Normal mediastinal contours . Normal pulmonary vasculature. LUNGS AND PLEURAL SPACES: No focal airspace opacity. Sharp costophrenic sulci. No pneumothoraces. BONY THORAX AND SOFT TISSUES: Unremarkable. _______________     IMPRESSION: Patient's known tree in bud airspace disease not appreciated with plain radiographs. No new confluent airspace opacity.     Xr Chest Pa Lat    Result Date: 8/7/2019  EXAM:  PA and Lateral Chest X-ray INDICATION: Upper respiratory tract infection about one half weeks ago COMPARISON: None ___________ FINDINGS: Heart and mediastinal contours are within normal limits. Lungs are clear of active disease. There are no pleural effusions. No acute osseous findings. _____________      IMPRESSION:  No acute process    Cta Chest W Or W Wo Cont    Result Date: 8/7/2019  EXAM: CTA chest INDICATION: Chest pain COMPARISON: None TECHNIQUE: Axial CT imaging from the thoracic inlet through the diaphragm with intravenous contrast. Coronal and sagittal MIP reformats were generated. One or more dose reduction techniques were used on this CT: automated exposure control, adjustment of the mAs and/or kVp according to patient size, and iterative reconstruction techniques. The specific techniques used on this CT exam have been documented in the patient's electronic medical record. Digital Imaging and Communications in Medicine (DICOM) format image data are available to nonaffiliated external healthcare facilities or entities on a secure, media free, reciprocally searchable basis with patient authorization for at least a 12-month period after this study. _______________ FINDINGS: This study was repeated due to poor contrast opacification on first attempt. EXAM QUALITY: Overall exam quality is satisfactory. Pulmonary arterial enhancement is adequate with adequate breath hold and no significant artifact. PULMONARY ARTERIES: No evidence of pulmonary embolism. LYMPH Nodes: Multiple enlarged right hilar lymph nodes are seen measuring up to 1.2 cm. There are enlarged AP window lymph nodes measuring 13 mm. Subcentimeter left hilar lymph nodes are seen. PLEURA: There are no pleural effusions HEART: Normal in size without pericardial effusion. VASCULATURE/MEDIASTINUM: There is no aortic dissection.  LUNGS: There are diffuse reticular nodular interstitial infiltrates with a miliary-type appearance throughout the right lung and to a lesser degree in the left lung. Differential diagnoses includes bacterial or viral pneumonia versus miliary tuberculosis versus thyroid or renal metastasis. Correlate with clinical picture. AIRWAY: Normal. UPPER ABDOMEN: There is 11 mm calculus in the upper pole the left kidney. Otherwise visualized solid organs are unremarkable. OTHER: No acute or aggressive osseous abnormalities identified. _______________     IMPRESSION: No evidence of a pulmonary embolism or aortic dissection. Mediastinal and hilar adenopathy. Diffuse reticular nodular interstitial infiltrates throughout both lungs with a miliary type pattern with differential diagnoses including bacterial or viral pneumonia versus miliary tuberculosis versus metastatic renal or thyroid carcinoma. Correlate with clinical picture. Dairl Curl PA informed 9:25 PM August 7, 2019. Ct Abd Pelv Wo Cont    Result Date: 8/8/2019  EXAM: CT of the abdomen and pelvis INDICATION: Potential renal lesion, adenopathy noted on preceding chest CT. COMPARISON: Correlation is made with prior CT scan of the chest 8/7/2019; no prior CT imaging of the abdomen or pelvis is available for review. TECHNIQUE: Axial CT imaging of the abdomen and pelvis was performed without oral or intravenous contrast. Multiplanar reformats were generated. One or more dose reduction techniques were used on this CT: automated exposure control, adjustment of the mAs and/or kVp according to patient size, and iterative reconstruction techniques. The specific techniques used on this CT exam have been documented in the patient's electronic medical record. Digital Imaging and Communications in Medicine (DICOM) format image data are available to nonaffiliated external healthcare facilities or entities on a secure, media free, reciprocally searchable basis with patient authorization for at least a 12-month period after this study.  _______________ FINDINGS: LOWER CHEST: In keeping with findings appreciated on prior CT scan of the chest, diffuse centrilobular nodules are present throughout the lower lobes bilaterally, with greater conspicuity present at the right lung base. Additional more mild appearing centrilobular infiltrate is present within the medial segment of the right middle lobe. No pleural effusion. Normal cardiac size. No pericardial effusion. LIVER, BILIARY: The unenhanced appearance of the liver demonstrates no focal abnormality. No biliary dilation. Gallbladder is unremarkable. PANCREAS: Normal unenhanced appearance. SPLEEN: Normal. ADRENALS: Normal. KIDNEYS/URETERS/BLADDER: There is excreted contrast present within the urinary collecting systems bilaterally pertaining to prior contrast enhanced CT scan of the chest. There is no hydronephrosis. Punctate nonobstructing lower pole right renal stone. No discrete renal lesion appreciated. Urinary bladder is decompressed and contains excreted contrast. PELVIC ORGANS: Uterus and adnexae are unremarkable in CT appearance. VASCULATURE: Abdominal aorta is of normal course and caliber. LYMPH NODES: No enlarged lymph nodes. GASTROINTESTINAL TRACT: The stomach, small intestine, and large intestine are normal in course and caliber. No focal bowel wall thickening or dilatation. No morphology of bowel obstruction. No free intraperitoneal gas. BONES: No acute or aggressive osseous abnormalities identified. OTHER: There is a small fat-containing umbilical hernia. _______________     IMPRESSION: 1. Redemonstration of centrilobular nodularity throughout the right middle and bilateral lower lobes as described. No evidence of pleural effusion. As previously, findings are in keeping with an endobronchial dissemination of inflammation/infection. 2. No suspicious renal lesion identified on this examination. Punctate nonobstructing lower pole right renal stone.  Excreted contrast within the collecting systems and urinary bladder noted from prior day's contrast enhanced chest CT. 3. No evidence of adenopathy within the abdomen or pelvis. 4. No focal bowel wall thickening or dilatation. Us Thyroid/parathyroid/soft Tiss    Result Date: 8/9/2019  EXAM: Thyroid ultrasound INDICATION: Miliary pattern seen on CTA chest. Prior history of Hashimoto's thyroiditis. COMPARISON: CTA chest 8/7/2019 _______________ FINDINGS: Thyroid ultrasound was performed. Right lobe measures 5.7 x 2.4 x 2.0 cm. It is diffusely and severely heterogeneous without discrete nodules. Vascularity appears normal. Left lobe measures 5.0 x 1.7 x 1.8 cm it has a similar appearance to the right with diffuse heterogeneity and normal vascularity. Isthmus measures 4 mm and is also diffusely heterogeneous without discrete nodules. _______________     IMPRESSION: 1. Extremely thyroid. Findings are consistent with patient's history of Hashimoto's thyroiditis. No discrete nodules. No results found for this or any previous visit.         CC: Jani Saleem MD

## 2019-08-11 NOTE — PROGRESS NOTES
[facility]                  Pulmonary, Critical Care, and Sleep Medicine     Name: Bari Victoria MRN: [id number]   : 1985 Hospital: [facility] [place]    Date: 2019        PCCM Note                                              Consult requesting physician: Dr. Elke Jenkins  Reason for Consult: abnormal CT, dyspnea, cough    Subjective/History:     Bari Victoria is a 27 y.o. female with PMHx significant for Hashimoto's thyroiditis, former smoker came to ER with dyspnea, cough and found to have abnormal CT, found to have influenza A positive. HPI 18: Pt works for [employer]. 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 She quit smoking about 1 month ago when she started heaving some respiratory issues. Pt developed HA, body ache and not feeling well, dry cough and mild dyspnea about 10-12 days ago, thought to have some URI sx about, seen by PCP. Since then, c/w mostly dry cough, occ seldom scant white expectoration. Never had hemoptysis. Mild dyspnea without wheezing. Low grade temp with feeling temp max 100. Feeling cold intermittently at night, but no night sweats. No wt loss lymphadenopathy GI sx. Since last couple of days, felt some sinus pain. Former smoker 0.5 PPD x13 yrs, quit 2019  No travel to TB endemic area. No recent travel at all. 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 No birds/pets at home No mold at home. Not living on farm or exposed to farm animals or farm things. Denies IVDA or immunosuppressed stated. Never had HIV or hepatitis. No illicit drug use. Never had any lung disease. No skin rash joint issues.      ILD History:   Hx of connective tissue disease such as Lupus, Scleroderma, RA: none  Hx of Sarcoidosis: none  Hx of meds such as methotrexate, amiodarone, nitrofurantoin: never  Hx of cancer, chemotherapy, radiation: never  Hx of birds, chickens, farm animals: none  Hx of molds, hot tubs: no  Hx of GERD: no  Hx of aspiration: no      8/11/2019  Feeling better   Using incentive spirometry. No fever chills  WBC normalized. Influenza A positive  Sputum cx moderate normal gianluca  AFB stain negative. cx pending. ESR elevated 67  CRP >9.5  No cough, no sputum production  No dyspnea   Hemodynamics stable  No other issues overnight. Review of Systems:   HEENT: No epistaxis, no nasal drainage, no difficulty in swallowing, no redness in eyes  Respiratory: as above  Cardiovascular: no chest pain, no palpitations, no chronic leg edema, no syncope  Gastrointestinal: no abd pain, no vomiting, no diarrhea, no bleeding symptoms  Genitourinary: No urinary symptoms or hematuria  Integument/breast: No ulcers or rashes  Musculoskeletal:Neg  Neurological: No focal weakness, no seizures, no headaches  Behvioral/Psych: No anxiety, no depression  Constitutional: No fever, no chills, no weight loss, no night sweats       Immunization status:   Immunization History   Administered Date(s) Administered    Tdap 06/28/2019        Past Medical History:   Diagnosis Date    Hashimoto's disease         Past Surgical History:   Procedure Laterality Date    HX KNEE REPLACEMENT          History reviewed. No pertinent family history. Social History     Tobacco Use    Smoking status: Former Smoker    Smokeless tobacco: Never Used   Substance Use Topics    Alcohol use: Not Currently        Prior to Admission medications    Medication Sig Start Date End Date Taking? Authorizing Provider   levothyroxine (SYNTHROID) 75 mcg tablet Take  by mouth Daily (before breakfast). Yes Other, MD Gisell   venlafaxine (EFFEXOR) 37.5 mg tablet Take 37.5 mg by mouth three (3) times daily. Yes Other, MD Gisell   norgestimate-ethinyl estradiol (ORTHO TRI-CYCLEN, TRI-SPRINTEC) 0.18/0.215/0.25 mg-35 mcg (28) tab Take  by mouth.    Yes Geena, MD Gisell Current Facility-Administered Medications   Medication Dose Route Frequency    cefTRIAXone (ROCEPHIN) 1 g in sterile water (preservative free) 10 mL IV syringe  1 g IntraVENous Q24H    azithromycin (ZITHROMAX) 500 mg in 0.9% sodium chloride 250 mL IVPB  500 mg IntraVENous Q24H    pantoprazole (PROTONIX) 40 mg in sodium chloride 0.9% 10 mL injection  40 mg IntraVENous DAILY    norgestimate-ethinyl estradiol (ORTHO-CYCLEN, SPRINTEC) 0.25-35 mg-mcg per tablet 1 Tab (Patient Supplied)  1 Tab Oral DAILY    oseltamivir (TAMIFLU) capsule 75 mg  75 mg Oral BID    heparin (porcine) injection 5,000 Units  5,000 Units SubCUTAneous Q8H    albuterol-ipratropium (DUO-NEB) 2.5 MG-0.5 MG/3 ML  3 mL Nebulization Q4H PRN    levothyroxine (SYNTHROID) tablet 75 mcg  75 mcg Oral ACB    venlafaxine (EFFEXOR) tablet 37.5 mg  37.5 mg Oral TID         Objective:   Vital Signs:    Visit Vitals  /83 (BP 1 Location: Left arm, BP Patient Position: Supine)   Pulse 82   Temp 98.3 °F (36.8 °C)   Resp 16   Ht 5' 5\" (1.651 m)   Wt 126.3 kg (278 lb 6.4 oz)   LMP 2019   SpO2 92%   Breastfeeding? No   BMI 46.33 kg/m²       O2 Device: Room air   O2 Flow Rate (L/min): 1 l/min   Temp (24hrs), Av.5 °F (36.9 °C), Min:98.3 °F (36.8 °C), Max:99.1 °F (37.3 °C)       Intake/Output:   Last shift:      No intake/output data recorded. Last 3 shifts:  1901 -  0700  In: 1240 [P.O.:720; I.V.:520]  Out: -     Intake/Output Summary (Last 24 hours) at 2019 0912  Last data filed at 2019 0303  Gross per 24 hour   Intake 980 ml   Output    Net 980 ml       Physical Exam:     General/Neurology: Alert, Awake, NAD. Head:   Normocephalic, without obvious abnormality, atraumatic. Eye:   EOM intact, no scleral icterus, no pallor, no cyanosis. Nose:   No sinus tenderness, no erythema, no induration, no discharge  Throat:  Lips, mucosa, and tongue normal. No oral thrush. Neck:   Supple, symmetric. No carotid bruit.  No lymphadenopathy. Trachea midline  Lung:   Good air entry bilateral equal. No wheezing. No stridors. No prolongded expiration. No accessory muscle use. No rales or rhonchi. Heart:   Regular rate & rhythm. S1 S2 present. No murmur. No JVD. Abdomen:  Soft. NT. ND. Obese. Extremities:  No pedal edema. No cyanosis. No clubbing. Pulses: 2+ and symmetric in DP. Capillary refill: normal.   Lymphatic:  No cervical or supraclavicular palpable lymphadenopathy. Musculoskeletal: No joint swelling. No tenderness. Skin:   Color, texture, turgor normal. No rashes or lesions. Data:       Recent Results (from the past 24 hour(s))   CBC WITH AUTOMATED DIFF    Collection Time: 08/11/19  4:19 AM   Result Value Ref Range    WBC 10.1 4.6 - 13.2 K/uL    RBC 4.36 4.20 - 5.30 M/uL    HGB 11.4 (L) 12.0 - 16.0 g/dL    HCT 36.4 35.0 - 45.0 %    MCV 83.5 74.0 - 97.0 FL    MCH 26.1 24.0 - 34.0 PG    MCHC 31.3 31.0 - 37.0 g/dL    RDW 15.1 (H) 11.6 - 14.5 %    PLATELET 741 719 - 198 K/uL    MPV 9.5 9.2 - 11.8 FL    NEUTROPHILS 63 40 - 73 %    LYMPHOCYTES 25 21 - 52 %    MONOCYTES 8 3 - 10 %    EOSINOPHILS 4 0 - 5 %    BASOPHILS 0 0 - 2 %    ABS. NEUTROPHILS 6.3 1.8 - 8.0 K/UL    ABS. LYMPHOCYTES 2.6 0.9 - 3.6 K/UL    ABS. MONOCYTES 0.8 0.05 - 1.2 K/UL    ABS. EOSINOPHILS 0.4 0.0 - 0.4 K/UL    ABS. BASOPHILS 0.0 0.0 - 0.1 K/UL    DF AUTOMATED           Chemistry Recent Labs     08/10/19  0332 08/09/19  0350   GLU 75 107*    142   K 4.3 4.4    109   CO2 26 27   BUN 13 14   CREA 0.67 0.58*   CA 8.2* 8.9   AGAP 10 6   BUCR 19 24*   AP 70 67   TP 6.8 6.7   ALB 2.9* 2.9*   GLOB 3.9 3.8   AGRAT 0.7* 0.8        Lactic Acid No results found for: LAC  No results for input(s): LAC in the last 72 hours.      Liver Enzymes Protein, total   Date Value Ref Range Status   08/10/2019 6.8 6.4 - 8.2 g/dL Final     Albumin   Date Value Ref Range Status   08/10/2019 2.9 (L) 3.4 - 5.0 g/dL Final     Globulin   Date Value Ref Range Status 08/10/2019 3.9 2.0 - 4.0 g/dL Final     A-G Ratio   Date Value Ref Range Status   08/10/2019 0.7 (L) 0.8 - 1.7   Final     AST (SGOT)   Date Value Ref Range Status   08/10/2019 11 10 - 38 U/L Final     Comment:     PLEASE NOTE NEW REFERENCE RANGE     Alk. phosphatase   Date Value Ref Range Status   08/10/2019 70 45 - 117 U/L Final     Recent Labs     08/10/19  0332 08/09/19  0350   TP 6.8 6.7   ALB 2.9* 2.9*   GLOB 3.9 3.8   AGRAT 0.7* 0.8   SGOT 11 11   AP 70 67        CBC w/Diff Recent Labs     08/11/19  0419 08/10/19  0332 08/09/19  0350   WBC 10.1 10.3 14.8*   RBC 4.36 4.07* 3.95*   HGB 11.4* 11.3* 10.9*   HCT 36.4 35.0 33.4*    342 348   GRANS 63 56 74*   LYMPH 25 33 18*   EOS 4 4 0        Cardiac Enzymes No results found for: CPK, CK, CKMMB, CKMB, RCK3, CKMBT, CKNDX, CKND1, PARESH, TROPT, TROIQ, ED, TROPT, TNIPOC, BNP, BNPP     BNP No results found for: BNP, BNPP, XBNPT     Coagulation No results for input(s): PTP, INR, APTT in the last 72 hours. No lab exists for component: INREXT, INREXT      Thyroid  Lab Results   Component Value Date/Time    TSH 3.64 08/08/2019 05:35 AM       No results found for: T4     Urinalysis Lab Results   Component Value Date/Time    Color YELLOW 08/07/2019 06:56 PM    Appearance CLEAR 08/07/2019 06:56 PM    Specific gravity 1.026 08/07/2019 06:56 PM    pH (UA) 7.0 08/07/2019 06:56 PM    Protein NEGATIVE  08/07/2019 06:56 PM    Glucose NEGATIVE  08/07/2019 06:56 PM    Ketone NEGATIVE  08/07/2019 06:56 PM    Bilirubin NEGATIVE  08/07/2019 06:56 PM    Urobilinogen 1.0 08/07/2019 06:56 PM    Nitrites NEGATIVE  08/07/2019 06:56 PM    Leukocyte Esterase NEGATIVE  08/07/2019 06:56 PM        Micro  Recent Labs     08/10/19  0845   CULT PENDING     Recent Labs     08/10/19  0845   CULT PENDING        ABG No results for input(s): PHI, PHI, POC2, PCO2I, PO2, PO2I, HCO3, HCO3I, FIO2, FIO2I in the last 72 hours.        ECHO 8/8/19 · Left Ventricle: Normal cavity size and systolic function (ejection fraction normal). Mild concentric hypertrophy. The estimated ejection fraction is 56 - 60%. There is mild (grade 1) left ventricular diastolic dysfunction E/E' ratio is 6.40. · Left Atrium: Mildly dilated left atrium. · Tricuspid Valve: Normal valve structure and no stenosis. Tricuspid regurgitation is inadequate for estimation of right ventricular systolic pressure. CTA chest 8/7/19:  FINDINGS: This study was repeated due to poor contrast opacification on first  attempt. EXAM QUALITY: Overall exam quality is satisfactory. Pulmonary arterial  enhancement is adequate with adequate breath hold and no significant artifact. PULMONARY ARTERIES: No evidence of pulmonary embolism. LYMPH Nodes: Multiple enlarged right hilar lymph nodes are seen measuring up to  1.2 cm. There are enlarged AP window lymph nodes measuring 13 mm. Subcentimeter  left hilar lymph nodes are seen. PLEURA: There are no pleural effusions  HEART: Normal in size without pericardial effusion. VASCULATURE/MEDIASTINUM: There is no aortic dissection. LUNGS: There are diffuse reticular nodular interstitial infiltrates with a  miliary-type appearance throughout the right lung and to a lesser degree in the  left lung. Differential diagnoses includes bacterial or viral pneumonia versus  miliary tuberculosis versus thyroid or renal metastasis. Correlate with clinical  picture. AIRWAY: Normal.  UPPER ABDOMEN: There is 11 mm calculus in the upper pole the left kidney. Otherwise visualized solid organs are unremarkable. OTHER: No acute or aggressive osseous abnormalities identified  _______________  IMPRESSION  IMPRESSION:  No evidence of a pulmonary embolism or aortic dissection. Mediastinal and hilar adenopathy.   Diffuse reticular nodular interstitial infiltrates throughout both lungs with a  miliary type pattern with differential diagnoses including bacterial or viral  pneumonia versus miliary tuberculosis versus metastatic renal or thyroid  carcinoma. Correlate with clinical picture.         CT abd pelvis 8/8/19:  1. Redemonstration of centrilobular nodularity throughout the right middle and  bilateral lower lobes as described. No evidence of pleural effusion. As  previously, findings are in keeping with an endobronchial dissemination of  inflammation/infection. 2. No suspicious renal lesion identified on this examination. Punctate  nonobstructing lower pole right renal stone. Excreted contrast within the  collecting systems and urinary bladder noted from prior day's contrast enhanced  chest CT. 3. No evidence of adenopathy within the abdomen or pelvis. 4. No focal bowel wall thickening or dilatation. USG thyroid 8/9/19:  Findings are consistent with patient's history of Hashimoto's thyroiditis. No discrete nodules. XR (Most Recent). CXR  reviewed by me and compared with previous CXR Results from Hospital Encounter encounter on 08/07/19   XR CHEST PA LAT    Narrative EXAM: Chest radiographs     CLINICAL INDICATION/HISTORY: Pneumonia, dyspnea    > Additional: None. COMPARISON: 08/07/2019    TECHNIQUE: PA and lateral views of the chest    _______________    FINDINGS:    HEART AND MEDIASTINUM: Cardiac silhouette is normal in size. Normal mediastinal  contours . Normal pulmonary vasculature. LUNGS AND PLEURAL SPACES: No focal airspace opacity. Sharp costophrenic sulci. No pneumothoraces. BONY THORAX AND SOFT TISSUES: Unremarkable.    _______________      Impression IMPRESSION: Patient's known tree in bud airspace disease not appreciated with  plain radiographs. No new confluent airspace opacity. IMPRESSION:   · Influenza A positive  · B/l extensive diffuse R>L reticular nodular interstitial infiltrates with mediastinal/hilar lymphadenopathy: differentials: infection like viral/atypical bacterial/bacterial/TB/NTB etc, inflammatory like sarcoidosis/HP, malignancy like mets.  Doubt other ILD like RB-ILD/DIP. · Mediastinal and hilar lymphadenopathy could be reactive to infection/inflammation, other differential includes lymphoma or malignancy. · Peripheral eosinophilia  · Hx of annual PPD negative, last negative in 10/2018  · Hx of Hashimoto's thyroiditis   · Mild anemia   · Former smoker 0.5 PPD x13 yrs, quit 07/2019  ·   Patient Active Problem List   Diagnosis Code    Dyspnea R06.00    Hashimoto's disease E06.3    Lung infiltrate on CT R91.8    Ex-smoker Z87.891    Morbid obesity with BMI of 45.0-49.9, adult ([facility] [address] E66.01, Z68.42    Influenza A J10.1           RECOMMENDATIONS:   WBC normalized. No fever. Cough resolved. No active pulmonary sx. Repeat cxr reviewed, improving tree-in-bud nodularities in lower lung zone. Sputum gram stain, cx: moderate normal gianluca. Sputum AFB 1st 8/8/19: stain negative. cx pending. Urine Ag panel: negative. Rapid influenza test (?exposed to children at job): Influenza A positive. ESR 67. CRP >9.5    Thyroid USG c/w Hashimoto's thyroiditis  HIV negative. ACE normal 46  HP panel, QTB plus: pending. Peripheral eosinophilia resolved. Complete tamiflu for 5 day course. Change rocephin/zithromx to levaquin x5 days on discharge. Follow final AFB cx, QTB, HP panel results. F/u with me in 4 weeks as outpatient. She will need repeat CT as outpatient. D/w patient. Depending on above w/u, and repeat CT as outpatient after Tamiflu treatment, she may need bronchoscopy with BAL for cell count/PCP/cytology/viral cx/AFB/fungal/gram stain/cx and TBBx to look for granulomas/cancer and lung biopsy also for micro. FiO2 to keep SpO2 >=92%, HOB >=30 degree, aspiration precautions, aggressive pulmonary toileting, incentive spirometry, PT/OT eval and treat, mobilization. DVT Prophylaxis: heparin  GI Prophylaxis: protonix  Smoking cessation counseling done by me and spent >3 minutes on it.   Other issues management by primary team and respective consultants. Further recommendations will be based on the patient's response to recommended treatment and results of the investigation ordered. Quality Care: PPI, DVT prophylaxis, HOB elevated, infection control all reviewed and addressed. Discussed with patient, radiologic work up showed, answered all questions to their satisfaction. D/w patient, Dr. Tammi Ramesh. Ok to d/c home from pulmonary standpoint.         Yair Martinez MD  8/11/2019

## 2019-08-11 NOTE — PROGRESS NOTES
2142: Tested pt's O2 levels on room air, pt sat 92, pt placed back on 0.5L via nasal cannula. 0355: Pt had removed their nasal cannula. CNA took O2 sat, 91%. Pt placed back on O2 1L. Shift summary: Pt slept throughout the night. Attempted to wean pt off of O2, pt's O2 sats decreased to 92. Pt on 1L via nasal cannula. No complaints of pain, or n/v. Pt claimed their cough had become more productive than it was previously, but was unable to provide a sample. Encouraged incentive spirometer use at that time. Patient Vitals for the past 12 hrs:   Temp Pulse Resp BP SpO2   08/11/19 0345 98.4 °F (36.9 °C) 85 16 134/81 91 %   08/10/19 2321 98.4 °F (36.9 °C) 85 16 133/84 96 %   08/10/19 2146     92 %   08/10/19 1926 99.1 °F (37.3 °C) 85 16 144/72 92 %     Bedside and Verbal shift change report given to Elvis Palacios RN (oncoming nurse) by Mirtha Amador RN (offgoing nurse). Report included the following information SBAR, MAR and Recent Results.

## 2019-08-12 LAB
A FUMIGATUS1 AB SER QL ID: NEGATIVE
A PULLULANS AB SER QL: NEGATIVE
BACTERIA SPEC CULT: ABNORMAL
BACTERIA SPEC CULT: ABNORMAL
GRAM STN SPEC: ABNORMAL
LACEYELLA SACCHARI AB SER QL: NEGATIVE
PIGEON SERUM AB QL ID: NEGATIVE
S RECTIVIRGULA AB SER QL ID: NEGATIVE
SERVICE CMNT-IMP: ABNORMAL
T VULGARIS AB SER QL ID: NEGATIVE

## 2019-08-15 LAB
M TB IFN-G BLD-IMP: NEGATIVE
QUANTIFERON CRITERIA, QFI1T: NORMAL
QUANTIFERON MITOGEN VALUE: 1.73 IU/ML
QUANTIFERON NIL VALUE: 0.06 IU/ML
QUANTIFERON TB1 AG: 0.02 IU/ML
QUANTIFERON TB2 AG: 0.03 IU/ML

## 2019-09-22 LAB
ACID FAST STN SPEC: NEGATIVE
MYCOBACTERIUM SPEC QL CULT: NEGATIVE
SPECIMEN PREPARATION: NORMAL
SPECIMEN SOURCE: NORMAL